# Patient Record
Sex: FEMALE | Race: WHITE | NOT HISPANIC OR LATINO | URBAN - METROPOLITAN AREA
[De-identification: names, ages, dates, MRNs, and addresses within clinical notes are randomized per-mention and may not be internally consistent; named-entity substitution may affect disease eponyms.]

---

## 2017-01-06 ENCOUNTER — OUTPATIENT (OUTPATIENT)
Dept: OUTPATIENT SERVICES | Facility: HOSPITAL | Age: 82
LOS: 1 days | Discharge: HOME | End: 2017-01-06
Payer: MEDICARE

## 2017-01-06 DIAGNOSIS — S83.241A OTHER TEAR OF MEDIAL MENISCUS, CURRENT INJURY, RIGHT KNEE, INITIAL ENCOUNTER: ICD-10-CM

## 2017-01-06 PROCEDURE — 93010 ELECTROCARDIOGRAM REPORT: CPT

## 2017-01-10 ENCOUNTER — OUTPATIENT (OUTPATIENT)
Dept: OUTPATIENT SERVICES | Facility: HOSPITAL | Age: 82
LOS: 1 days | Discharge: HOME | End: 2017-01-10
Payer: MEDICARE

## 2017-01-10 DIAGNOSIS — I10 ESSENTIAL (PRIMARY) HYPERTENSION: ICD-10-CM

## 2017-01-10 DIAGNOSIS — K21.9 GASTRO-ESOPHAGEAL REFLUX DISEASE WITHOUT ESOPHAGITIS: ICD-10-CM

## 2017-01-10 DIAGNOSIS — R06.09 OTHER FORMS OF DYSPNEA: ICD-10-CM

## 2017-01-10 DIAGNOSIS — M25.569 PAIN IN UNSPECIFIED KNEE: ICD-10-CM

## 2017-01-10 DIAGNOSIS — Z86.79 PERSONAL HISTORY OF OTHER DISEASES OF THE CIRCULATORY SYSTEM: ICD-10-CM

## 2017-01-10 DIAGNOSIS — H35.30 UNSPECIFIED MACULAR DEGENERATION: ICD-10-CM

## 2017-01-10 DIAGNOSIS — M19.90 UNSPECIFIED OSTEOARTHRITIS, UNSPECIFIED SITE: ICD-10-CM

## 2017-01-10 DIAGNOSIS — Z87.01 PERSONAL HISTORY OF PNEUMONIA (RECURRENT): ICD-10-CM

## 2017-01-10 DIAGNOSIS — Z95.0 PRESENCE OF CARDIAC PACEMAKER: ICD-10-CM

## 2017-01-10 DIAGNOSIS — M24.10 OTHER ARTICULAR CARTILAGE DISORDERS, UNSPECIFIED SITE: ICD-10-CM

## 2017-01-10 DIAGNOSIS — E78.5 HYPERLIPIDEMIA, UNSPECIFIED: ICD-10-CM

## 2017-01-10 PROCEDURE — 88304 TISSUE EXAM BY PATHOLOGIST: CPT | Mod: 26

## 2017-01-13 DIAGNOSIS — S83.231A COMPLEX TEAR OF MEDIAL MENISCUS, CURRENT INJURY, RIGHT KNEE, INITIAL ENCOUNTER: ICD-10-CM
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
## 2017-01-13 DIAGNOSIS — S83.271A COMPLEX TEAR OF LATERAL MENISCUS, CURRENT INJURY, RIGHT KNEE, INITIAL ENCOUNTER: ICD-10-CM

## 2017-01-13 DIAGNOSIS — Z79.01 LONG TERM (CURRENT) USE OF ANTICOAGULANTS: ICD-10-CM

## 2017-01-13 DIAGNOSIS — M19.90 UNSPECIFIED OSTEOARTHRITIS, UNSPECIFIED SITE: ICD-10-CM

## 2017-01-13 DIAGNOSIS — Z88.8 ALLERGY STATUS TO OTHER DRUGS, MEDICAMENTS AND BIOLOGICAL SUBSTANCES STATUS: ICD-10-CM

## 2017-01-13 DIAGNOSIS — I48.91 UNSPECIFIED ATRIAL FIBRILLATION: ICD-10-CM

## 2017-01-13 DIAGNOSIS — I10 ESSENTIAL (PRIMARY) HYPERTENSION: ICD-10-CM

## 2017-01-13 DIAGNOSIS — I49.5 SICK SINUS SYNDROME: ICD-10-CM

## 2017-01-13 DIAGNOSIS — I25.10 ATHEROSCLEROTIC HEART DISEASE OF NATIVE CORONARY ARTERY WITHOUT ANGINA PECTORIS: ICD-10-CM

## 2017-01-13 DIAGNOSIS — M94.261 CHONDROMALACIA, RIGHT KNEE: ICD-10-CM
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
## 2017-01-13 DIAGNOSIS — M67.51 PLICA SYNDROME, RIGHT KNEE: ICD-10-CM

## 2017-01-13 DIAGNOSIS — Z95.0 PRESENCE OF CARDIAC PACEMAKER: ICD-10-CM

## 2017-04-18 ENCOUNTER — APPOINTMENT (OUTPATIENT)
Dept: ELECTROPHYSIOLOGY | Facility: CLINIC | Age: 82
End: 2017-04-18

## 2017-07-19 ENCOUNTER — APPOINTMENT (OUTPATIENT)
Dept: ELECTROPHYSIOLOGY | Facility: CLINIC | Age: 82
End: 2017-07-19

## 2017-07-19 VITALS
HEIGHT: 60 IN | HEART RATE: 83 BPM | SYSTOLIC BLOOD PRESSURE: 125 MMHG | DIASTOLIC BLOOD PRESSURE: 57 MMHG | WEIGHT: 141 LBS | BODY MASS INDEX: 27.68 KG/M2

## 2017-10-24 ENCOUNTER — APPOINTMENT (OUTPATIENT)
Dept: ELECTROPHYSIOLOGY | Facility: CLINIC | Age: 82
End: 2017-10-24
Payer: MEDICARE

## 2017-10-24 PROCEDURE — 93294 REM INTERROG EVL PM/LDLS PM: CPT

## 2017-10-24 PROCEDURE — 93296 REM INTERROG EVL PM/IDS: CPT

## 2018-01-29 ENCOUNTER — APPOINTMENT (OUTPATIENT)
Dept: ELECTROPHYSIOLOGY | Facility: CLINIC | Age: 83
End: 2018-01-29

## 2018-05-03 ENCOUNTER — APPOINTMENT (OUTPATIENT)
Dept: ELECTROPHYSIOLOGY | Facility: CLINIC | Age: 83
End: 2018-05-03
Payer: MEDICARE

## 2018-05-03 PROCEDURE — 93294 REM INTERROG EVL PM/LDLS PM: CPT

## 2018-05-03 PROCEDURE — 93296 REM INTERROG EVL PM/IDS: CPT

## 2018-07-25 ENCOUNTER — APPOINTMENT (OUTPATIENT)
Dept: ELECTROPHYSIOLOGY | Facility: CLINIC | Age: 83
End: 2018-07-25
Payer: MEDICARE

## 2018-07-25 VITALS
SYSTOLIC BLOOD PRESSURE: 128 MMHG | DIASTOLIC BLOOD PRESSURE: 101 MMHG | BODY MASS INDEX: 27.68 KG/M2 | HEIGHT: 60 IN | HEART RATE: 72 BPM | WEIGHT: 141 LBS

## 2018-07-25 PROCEDURE — 93280 PM DEVICE PROGR EVAL DUAL: CPT

## 2018-10-29 ENCOUNTER — APPOINTMENT (OUTPATIENT)
Dept: ELECTROPHYSIOLOGY | Facility: CLINIC | Age: 83
End: 2018-10-29
Payer: MEDICARE

## 2018-10-29 PROCEDURE — 93294 REM INTERROG EVL PM/LDLS PM: CPT

## 2018-10-29 PROCEDURE — 93296 REM INTERROG EVL PM/IDS: CPT

## 2019-01-28 ENCOUNTER — APPOINTMENT (OUTPATIENT)
Dept: ELECTROPHYSIOLOGY | Facility: CLINIC | Age: 84
End: 2019-01-28

## 2019-05-02 ENCOUNTER — APPOINTMENT (OUTPATIENT)
Dept: ELECTROPHYSIOLOGY | Facility: CLINIC | Age: 84
End: 2019-05-02
Payer: MEDICARE

## 2019-05-02 PROCEDURE — 93296 REM INTERROG EVL PM/IDS: CPT

## 2019-05-02 PROCEDURE — 93294 REM INTERROG EVL PM/LDLS PM: CPT

## 2019-05-07 ENCOUNTER — RX RENEWAL (OUTPATIENT)
Age: 84
End: 2019-05-07

## 2019-05-20 ENCOUNTER — APPOINTMENT (OUTPATIENT)
Dept: GASTROENTEROLOGY | Facility: CLINIC | Age: 84
End: 2019-05-20
Payer: MEDICARE

## 2019-05-20 VITALS
HEIGHT: 60 IN | WEIGHT: 140 LBS | BODY MASS INDEX: 27.48 KG/M2 | SYSTOLIC BLOOD PRESSURE: 121 MMHG | DIASTOLIC BLOOD PRESSURE: 74 MMHG | TEMPERATURE: 98.3 F | HEART RATE: 86 BPM

## 2019-05-20 PROCEDURE — 99212 OFFICE O/P EST SF 10 MIN: CPT

## 2019-05-20 RX ORDER — VERAPAMIL HYDROCHLORIDE 80 MG/1
TABLET ORAL
Refills: 0 | Status: ACTIVE | COMMUNITY

## 2019-05-20 RX ORDER — SOTALOL HYDROCHLORIDE 80 MG/1
80 TABLET ORAL
Refills: 0 | Status: ACTIVE | COMMUNITY

## 2019-05-20 NOTE — HISTORY OF PRESENT ILLNESS
[de-identified] : 84 yo female with GERD symptoms. Patient continues to note heartburn symptoms there is no dysphagia. She's taking pantoprazole once daily. Upper GI series did show that the prior repair of the hiatal hernia is no longer present.

## 2019-05-20 NOTE — ASSESSMENT
[FreeTextEntry1] : 84 yo female with refractory GERD symptoms. Will increase pantoprazole to b.i.d. and add q.h.s. Pepcid AC followup a month. Patient also advised lifestyle modifications.

## 2019-07-26 ENCOUNTER — APPOINTMENT (OUTPATIENT)
Dept: ELECTROPHYSIOLOGY | Facility: CLINIC | Age: 84
End: 2019-07-26
Payer: MEDICARE

## 2019-07-26 VITALS
BODY MASS INDEX: 27.48 KG/M2 | HEART RATE: 86 BPM | SYSTOLIC BLOOD PRESSURE: 141 MMHG | HEIGHT: 60 IN | WEIGHT: 140 LBS | DIASTOLIC BLOOD PRESSURE: 81 MMHG

## 2019-07-26 DIAGNOSIS — I48.0 PAROXYSMAL ATRIAL FIBRILLATION: ICD-10-CM

## 2019-07-26 PROCEDURE — 93280 PM DEVICE PROGR EVAL DUAL: CPT

## 2019-10-28 ENCOUNTER — APPOINTMENT (OUTPATIENT)
Dept: ELECTROPHYSIOLOGY | Facility: CLINIC | Age: 84
End: 2019-10-28
Payer: MEDICARE

## 2019-10-28 PROCEDURE — 93294 REM INTERROG EVL PM/LDLS PM: CPT

## 2019-10-28 PROCEDURE — 93296 REM INTERROG EVL PM/IDS: CPT

## 2019-12-26 ENCOUNTER — RX RENEWAL (OUTPATIENT)
Age: 84
End: 2019-12-26

## 2020-02-03 ENCOUNTER — APPOINTMENT (OUTPATIENT)
Dept: ELECTROPHYSIOLOGY | Facility: CLINIC | Age: 85
End: 2020-02-03
Payer: MEDICARE

## 2020-02-03 DIAGNOSIS — Z95.0 PRESENCE OF CARDIAC PACEMAKER: ICD-10-CM

## 2020-02-03 DIAGNOSIS — I49.5 SICK SINUS SYNDROME: ICD-10-CM

## 2020-02-03 PROCEDURE — 93296 REM INTERROG EVL PM/IDS: CPT

## 2020-02-03 PROCEDURE — 93294 REM INTERROG EVL PM/LDLS PM: CPT

## 2020-02-13 PROBLEM — Z95.0 CARDIAC PACEMAKER IN SITU: Status: ACTIVE | Noted: 2017-05-02

## 2020-02-13 PROBLEM — I49.5 SICK SINUS SYNDROME: Status: ACTIVE | Noted: 2017-05-02

## 2020-05-04 ENCOUNTER — APPOINTMENT (OUTPATIENT)
Dept: ELECTROPHYSIOLOGY | Facility: CLINIC | Age: 85
End: 2020-05-04
Payer: MEDICARE

## 2020-05-04 PROCEDURE — 93296 REM INTERROG EVL PM/IDS: CPT

## 2020-05-04 PROCEDURE — 93294 REM INTERROG EVL PM/LDLS PM: CPT

## 2020-07-07 ENCOUNTER — EMERGENCY (EMERGENCY)
Facility: HOSPITAL | Age: 85
LOS: 0 days | Discharge: HOME | End: 2020-07-07
Attending: EMERGENCY MEDICINE
Payer: MEDICARE

## 2020-07-07 VITALS
OXYGEN SATURATION: 100 % | HEART RATE: 65 BPM | TEMPERATURE: 98 F | RESPIRATION RATE: 16 BRPM | DIASTOLIC BLOOD PRESSURE: 55 MMHG | SYSTOLIC BLOOD PRESSURE: 127 MMHG

## 2020-07-07 VITALS — WEIGHT: 143.08 LBS | HEIGHT: 60 IN

## 2020-07-07 DIAGNOSIS — E78.5 HYPERLIPIDEMIA, UNSPECIFIED: ICD-10-CM

## 2020-07-07 DIAGNOSIS — R06.00 DYSPNEA, UNSPECIFIED: ICD-10-CM

## 2020-07-07 DIAGNOSIS — I48.91 UNSPECIFIED ATRIAL FIBRILLATION: ICD-10-CM

## 2020-07-07 DIAGNOSIS — Z79.01 LONG TERM (CURRENT) USE OF ANTICOAGULANTS: ICD-10-CM

## 2020-07-07 DIAGNOSIS — Z95.0 PRESENCE OF CARDIAC PACEMAKER: ICD-10-CM

## 2020-07-07 DIAGNOSIS — R42 DIZZINESS AND GIDDINESS: ICD-10-CM

## 2020-07-07 DIAGNOSIS — K21.9 GASTRO-ESOPHAGEAL REFLUX DISEASE WITHOUT ESOPHAGITIS: ICD-10-CM

## 2020-07-07 DIAGNOSIS — R06.02 SHORTNESS OF BREATH: ICD-10-CM

## 2020-07-07 DIAGNOSIS — I10 ESSENTIAL (PRIMARY) HYPERTENSION: ICD-10-CM

## 2020-07-07 LAB
ALBUMIN SERPL ELPH-MCNC: 3.4 G/DL — SIGNIFICANT CHANGE UP (ref 3.3–5)
ALP SERPL-CCNC: 100 U/L — SIGNIFICANT CHANGE UP (ref 40–120)
ALT FLD-CCNC: 136 U/L — HIGH (ref 12–78)
ANION GAP SERPL CALC-SCNC: 6 MMOL/L — SIGNIFICANT CHANGE UP (ref 5–17)
AST SERPL-CCNC: 142 U/L — HIGH (ref 15–37)
BILIRUB SERPL-MCNC: 0.4 MG/DL — SIGNIFICANT CHANGE UP (ref 0.2–1.2)
BUN SERPL-MCNC: 17 MG/DL — SIGNIFICANT CHANGE UP (ref 7–23)
CALCIUM SERPL-MCNC: 8.8 MG/DL — SIGNIFICANT CHANGE UP (ref 8.5–10.1)
CHLORIDE SERPL-SCNC: 102 MMOL/L — SIGNIFICANT CHANGE UP (ref 96–108)
CO2 SERPL-SCNC: 25 MMOL/L — SIGNIFICANT CHANGE UP (ref 22–31)
CREAT SERPL-MCNC: 0.98 MG/DL — SIGNIFICANT CHANGE UP (ref 0.5–1.3)
GLUCOSE SERPL-MCNC: 149 MG/DL — HIGH (ref 70–99)
HCT VFR BLD CALC: 39.5 % — SIGNIFICANT CHANGE UP (ref 34.5–45)
HGB BLD-MCNC: 12.7 G/DL — SIGNIFICANT CHANGE UP (ref 11.5–15.5)
MAGNESIUM SERPL-MCNC: 2.2 MG/DL — SIGNIFICANT CHANGE UP (ref 1.6–2.6)
MCHC RBC-ENTMCNC: 28.9 PG — SIGNIFICANT CHANGE UP (ref 27–34)
MCHC RBC-ENTMCNC: 32.2 GM/DL — SIGNIFICANT CHANGE UP (ref 32–36)
MCV RBC AUTO: 89.8 FL — SIGNIFICANT CHANGE UP (ref 80–100)
NT-PROBNP SERPL-SCNC: 3258 PG/ML — HIGH (ref 0–450)
PLATELET # BLD AUTO: 206 K/UL — SIGNIFICANT CHANGE UP (ref 150–400)
POTASSIUM SERPL-MCNC: 5.2 MMOL/L — SIGNIFICANT CHANGE UP (ref 3.5–5.3)
POTASSIUM SERPL-SCNC: 5.2 MMOL/L — SIGNIFICANT CHANGE UP (ref 3.5–5.3)
PROT SERPL-MCNC: 7.2 GM/DL — SIGNIFICANT CHANGE UP (ref 6–8.3)
RBC # BLD: 4.4 M/UL — SIGNIFICANT CHANGE UP (ref 3.8–5.2)
RBC # FLD: 14 % — SIGNIFICANT CHANGE UP (ref 10.3–14.5)
SODIUM SERPL-SCNC: 133 MMOL/L — LOW (ref 135–145)
TROPONIN I SERPL-MCNC: <0.015 NG/ML — SIGNIFICANT CHANGE UP (ref 0.01–0.04)
WBC # BLD: 8.17 K/UL — SIGNIFICANT CHANGE UP (ref 3.8–10.5)
WBC # FLD AUTO: 8.17 K/UL — SIGNIFICANT CHANGE UP (ref 3.8–10.5)

## 2020-07-07 PROCEDURE — 83880 ASSAY OF NATRIURETIC PEPTIDE: CPT

## 2020-07-07 PROCEDURE — 99284 EMERGENCY DEPT VISIT MOD MDM: CPT | Mod: 25

## 2020-07-07 PROCEDURE — 71045 X-RAY EXAM CHEST 1 VIEW: CPT | Mod: 26

## 2020-07-07 PROCEDURE — 84484 ASSAY OF TROPONIN QUANT: CPT

## 2020-07-07 PROCEDURE — 85027 COMPLETE CBC AUTOMATED: CPT

## 2020-07-07 PROCEDURE — 71045 X-RAY EXAM CHEST 1 VIEW: CPT

## 2020-07-07 PROCEDURE — 93010 ELECTROCARDIOGRAM REPORT: CPT

## 2020-07-07 PROCEDURE — 83735 ASSAY OF MAGNESIUM: CPT

## 2020-07-07 PROCEDURE — 36415 COLL VENOUS BLD VENIPUNCTURE: CPT

## 2020-07-07 PROCEDURE — 80053 COMPREHEN METABOLIC PANEL: CPT

## 2020-07-07 PROCEDURE — 99285 EMERGENCY DEPT VISIT HI MDM: CPT

## 2020-07-07 PROCEDURE — 76705 ECHO EXAM OF ABDOMEN: CPT

## 2020-07-07 PROCEDURE — 76705 ECHO EXAM OF ABDOMEN: CPT | Mod: 26

## 2020-07-07 PROCEDURE — 93005 ELECTROCARDIOGRAM TRACING: CPT

## 2020-07-07 NOTE — ED PROVIDER NOTE - PATIENT PORTAL LINK FT
You can access the FollowMyHealth Patient Portal offered by [facility] by registering at the following website: [email or web address]. By joining [employer]’s FollowMyHealth portal, you will also be able to view your health information using other applications (apps) compatible with our system.

## 2020-07-07 NOTE — ED PROVIDER NOTE - CARE PROVIDER_API CALL
Ruslan Soria  GASTROENTEROLOGY  [address] [place] [address]  Phone: [phone]  Fax: [phone]  Follow Up Time:

## 2020-07-07 NOTE — ED PROVIDER NOTE - OBJECTIVE STATEMENT
87 y/o female with PMHx of GERD, HLD, HTN, pacemaker  presents to the ED c/o SOB and dizziness over the last few days. States that she is being worked up for vertigo x1 month. Pt spoke with PCP today and was told to come to the ED for further evaluation. Denies CP, cough, fever or chills. On Pradaxa. No known sick contacts. Allergies: Zoloft. No other complaints at this time. Cardio: Dr. Tripp. 87 y/o female with PMHx of Afib, GERD, HLD, HTN, pacemaker  presents to the ED c/o SOB and dizziness over the last few days. States that she is being worked up for vertigo x1 month. Pt spoke with PCP today and was told to come to the ED for further evaluation. Denies CP, cough, fever or chills. On Pradaxa. No known sick contacts. Allergies: Zoloft. No other complaints at this time. Cardio: Dr. Tripp.

## 2020-07-07 NOTE — ED PROVIDER NOTE - PMH
GERD (gastroesophageal reflux disease)    High cholesterol    HTN (hypertension)    Pacemaker Afib    GERD (gastroesophageal reflux disease)    High cholesterol    HTN (hypertension)    Pacemaker

## 2020-07-07 NOTE — ED ADULT NURSE NOTE - NSIMPLEMENTINTERV_GEN_ALL_ED
Implemented All Fall Risk Interventions:  [place] to call system. Call bell, personal items and telephone within reach. Instruct patient to call for assistance. Room bathroom lighting operational. Non-slip footwear when patient is off stretcher. Physically safe environment: no spills, clutter or unnecessary equipment. Stretcher in lowest position, wheels locked, appropriate side rails in place. Provide visual cue, wrist band, yellow gown, etc. Monitor gait and stability. Monitor for mental status changes and reorient to person, place, and time. Review medications for side effects contributing to fall risk. Reinforce activity limits and safety measures with patient and family.

## 2020-07-07 NOTE — ED STATDOCS - PROGRESS NOTE DETAILS
Michael HAMMOND for ED attending, Dr. Franco: 87 y/o F with PMHx of Afib, GERD, High cholesterol, HTN, Pacemaker  presenting to the ED c/o SOB x2 days. +dizziness Patient reports that over the past few days she has had SOB with associated diaphoresis, feeling "unsteady on her feet. States that she has been having workup for vertigo x1 month, spoke with PCD today and told patient to go to the ED for further evaluation. Denies any CP, cough, fever or chills. Patient will be sent to main for further evaluation.

## 2020-07-07 NOTE — ED ADULT NURSE NOTE - OBJECTIVE STATEMENT
Pt presents to ED for Dizziness. Pt states symptoms a few days ago. Pt also states shes been sweating perfusely. Denies fevers. Pt denies any recent falls. Denies n/v/d. Neuro WNL

## 2020-07-07 NOTE — ED PROVIDER NOTE - NS_ ATTENDINGSCRIBEDETAILS _ED_A_ED_FT
I, Teofilo Matt MD,  performed the initial face to face bedside interview with this patient regarding history of present illness, review of symptoms and relevant past medical, social and family history.  I completed an independent physical examination.    The history, relevant review of systems, past medical and surgical history, medical decision making, and physical examination was documented by the scribe in my presence and I attest to the accuracy of the documentation.

## 2020-07-07 NOTE — ED STATDOCS - PMH
Afib    GERD (gastroesophageal reflux disease)    High cholesterol    HTN (hypertension)    Pacemaker

## 2020-07-07 NOTE — ED PROVIDER NOTE - NSFOLLOWUPINSTRUCTIONS_ED_ALL_ED_FT
Shortness of Breath    WHAT YOU NEED TO KNOW:    Shortness of breath is a feeling that you cannot get enough air when you breathe in. You may have this feeling only during activity, or all the time. Your symptoms can range from mild to severe. Shortness of breath may be a sign of a serious health condition that needs immediate care.    DISCHARGE INSTRUCTIONS:    Return to the emergency department if:     Your signs and symptoms are the same or worse within 24 hours of treatment.       The skin over your ribs or on your neck sinks in when you breathe.       You feel confused or dizzy.    Contact your healthcare provider if:     You have new or worsening symptoms.      You have questions or concerns about your condition or care.    Medicines:     Medicines may be used to treat the cause of your symptoms. You may need medicine to treat a bacterial infection or reduce anxiety. Other medicines may be used to open your airway, reduce swelling, or remove extra fluid. If you have a heart condition, you may need medicine to help your heart beat more strongly or regularly.      Take your medicine as directed. Contact your healthcare provider if you think your medicine is not helping or if you have side effects. Tell him or her if you are allergic to any medicine. Keep a list of the medicines, vitamins, and herbs you take. Include the amounts, and when and why you take them. Bring the list or the pill bottles to follow-up visits. Carry your medicine list with you in case of an emergency.    Manage shortness of breath:     Create an action plan. You and your healthcare provider can work together to create a plan for how to handle shortness of breath. The plan can include daily activities, treatment changes, and what to do if you have severe breathing problems.      Lean forward on your elbows when you sit. This helps your lungs expand and may make it easier to breathe.      Use pursed-lip breathing any time you feel short of breath. Breathe in through your nose and then slowly breathe out through your mouth with your lips slightly puckered. It should take you twice as long to breathe out as it did to breathe in.Breathe in Breathe out           Do not smoke. Nicotine and other chemicals in cigarettes and cigars can cause lung damage and make shortness of breath worse. Ask your healthcare provider for information if you currently smoke and need help to quit. E-cigarettes or smokeless tobacco still contain nicotine. Talk to your healthcare provider before you use these products.      Reach or maintain a healthy weight. Your healthcare provider can help you create a safe weight loss plan if you are overweight.      Exercise as directed. Exercise can help your lungs work more easily. Exercise can also help you lose weight if needed. Try to get at least 30 minutes of exercise most days of the week.    Follow up with your healthcare provider or specialist as directed: Write down your questions so you remember to ask them during your visits.    Please follow up with GI for elevated LFT's. Positive for cholelithiasis.

## 2020-07-10 ENCOUNTER — INPATIENT (INPATIENT)
Facility: HOSPITAL | Age: 85
LOS: 1 days | Discharge: HOME | DRG: 291 | End: 2020-07-12
Attending: HOSPITALIST | Admitting: INTERNAL MEDICINE
Payer: MEDICARE

## 2020-07-10 VITALS — WEIGHT: 149.91 LBS | HEIGHT: 60 IN

## 2020-07-10 DIAGNOSIS — R06.02 SHORTNESS OF BREATH: ICD-10-CM

## 2020-07-10 LAB
ALBUMIN SERPL ELPH-MCNC: 3.5 G/DL — SIGNIFICANT CHANGE UP (ref 3.3–5)
ALP SERPL-CCNC: 93 U/L — SIGNIFICANT CHANGE UP (ref 40–120)
ALT FLD-CCNC: 94 U/L — HIGH (ref 12–78)
ANION GAP SERPL CALC-SCNC: 7 MMOL/L — SIGNIFICANT CHANGE UP (ref 5–17)
APTT BLD: 44.6 SEC — HIGH (ref 27.5–35.5)
AST SERPL-CCNC: 54 U/L — HIGH (ref 15–37)
BASOPHILS # BLD AUTO: 0.05 K/UL — SIGNIFICANT CHANGE UP (ref 0–0.2)
BASOPHILS NFR BLD AUTO: 0.5 % — SIGNIFICANT CHANGE UP (ref 0–2)
BILIRUB SERPL-MCNC: 0.4 MG/DL — SIGNIFICANT CHANGE UP (ref 0.2–1.2)
BUN SERPL-MCNC: 14 MG/DL — SIGNIFICANT CHANGE UP (ref 7–23)
CALCIUM SERPL-MCNC: 8.9 MG/DL — SIGNIFICANT CHANGE UP (ref 8.5–10.1)
CHLORIDE SERPL-SCNC: 91 MMOL/L — LOW (ref 96–108)
CO2 SERPL-SCNC: 26 MMOL/L — SIGNIFICANT CHANGE UP (ref 22–31)
CREAT SERPL-MCNC: 0.9 MG/DL — SIGNIFICANT CHANGE UP (ref 0.5–1.3)
EOSINOPHIL # BLD AUTO: 0.06 K/UL — SIGNIFICANT CHANGE UP (ref 0–0.5)
EOSINOPHIL NFR BLD AUTO: 0.6 % — SIGNIFICANT CHANGE UP (ref 0–6)
GLUCOSE SERPL-MCNC: 126 MG/DL — HIGH (ref 70–99)
HCT VFR BLD CALC: 37.9 % — SIGNIFICANT CHANGE UP (ref 34.5–45)
HGB BLD-MCNC: 13.1 G/DL — SIGNIFICANT CHANGE UP (ref 11.5–15.5)
IMM GRANULOCYTES NFR BLD AUTO: 0.2 % — SIGNIFICANT CHANGE UP (ref 0–1.5)
INR BLD: 1.36 RATIO — HIGH (ref 0.88–1.16)
LYMPHOCYTES # BLD AUTO: 1.6 K/UL — SIGNIFICANT CHANGE UP (ref 1–3.3)
LYMPHOCYTES # BLD AUTO: 16.6 % — SIGNIFICANT CHANGE UP (ref 13–44)
MCHC RBC-ENTMCNC: 29.7 PG — SIGNIFICANT CHANGE UP (ref 27–34)
MCHC RBC-ENTMCNC: 34.6 GM/DL — SIGNIFICANT CHANGE UP (ref 32–36)
MCV RBC AUTO: 85.9 FL — SIGNIFICANT CHANGE UP (ref 80–100)
MONOCYTES # BLD AUTO: 1.2 K/UL — HIGH (ref 0–0.9)
MONOCYTES NFR BLD AUTO: 12.4 % — SIGNIFICANT CHANGE UP (ref 2–14)
NEUTROPHILS # BLD AUTO: 6.72 K/UL — SIGNIFICANT CHANGE UP (ref 1.8–7.4)
NEUTROPHILS NFR BLD AUTO: 69.7 % — SIGNIFICANT CHANGE UP (ref 43–77)
NT-PROBNP SERPL-SCNC: 6877 PG/ML — HIGH (ref 0–450)
PLATELET # BLD AUTO: 254 K/UL — SIGNIFICANT CHANGE UP (ref 150–400)
POTASSIUM SERPL-MCNC: 4.7 MMOL/L — SIGNIFICANT CHANGE UP (ref 3.5–5.3)
POTASSIUM SERPL-SCNC: 4.7 MMOL/L — SIGNIFICANT CHANGE UP (ref 3.5–5.3)
PROT SERPL-MCNC: 7.5 GM/DL — SIGNIFICANT CHANGE UP (ref 6–8.3)
PROTHROM AB SERPL-ACNC: 15.7 SEC — HIGH (ref 10.6–13.6)
RBC # BLD: 4.41 M/UL — SIGNIFICANT CHANGE UP (ref 3.8–5.2)
RBC # FLD: 13.7 % — SIGNIFICANT CHANGE UP (ref 10.3–14.5)
SODIUM SERPL-SCNC: 124 MMOL/L — LOW (ref 135–145)
TROPONIN I SERPL-MCNC: <0.015 NG/ML — SIGNIFICANT CHANGE UP (ref 0.01–0.04)
WBC # BLD: 9.65 K/UL — SIGNIFICANT CHANGE UP (ref 3.8–10.5)
WBC # FLD AUTO: 9.65 K/UL — SIGNIFICANT CHANGE UP (ref 3.8–10.5)

## 2020-07-10 PROCEDURE — 71045 X-RAY EXAM CHEST 1 VIEW: CPT

## 2020-07-10 PROCEDURE — 80053 COMPREHEN METABOLIC PANEL: CPT

## 2020-07-10 PROCEDURE — 93306 TTE W/DOPPLER COMPLETE: CPT

## 2020-07-10 PROCEDURE — 71045 X-RAY EXAM CHEST 1 VIEW: CPT | Mod: 26

## 2020-07-10 PROCEDURE — 99223 1ST HOSP IP/OBS HIGH 75: CPT | Mod: AI

## 2020-07-10 PROCEDURE — 83930 ASSAY OF BLOOD OSMOLALITY: CPT

## 2020-07-10 PROCEDURE — 83880 ASSAY OF NATRIURETIC PEPTIDE: CPT

## 2020-07-10 PROCEDURE — 93010 ELECTROCARDIOGRAM REPORT: CPT

## 2020-07-10 PROCEDURE — 84484 ASSAY OF TROPONIN QUANT: CPT

## 2020-07-10 PROCEDURE — 84443 ASSAY THYROID STIM HORMONE: CPT

## 2020-07-10 PROCEDURE — 36415 COLL VENOUS BLD VENIPUNCTURE: CPT

## 2020-07-10 PROCEDURE — 86769 SARS-COV-2 COVID-19 ANTIBODY: CPT

## 2020-07-10 RX ORDER — ASPIRIN/CALCIUM CARB/MAGNESIUM 324 MG
325 TABLET ORAL ONCE
Refills: 0 | Status: COMPLETED | OUTPATIENT
Start: 2020-07-10 | End: 2020-07-10

## 2020-07-10 RX ADMIN — Medication 325 MILLIGRAM(S): at 20:18

## 2020-07-10 NOTE — ED PROVIDER NOTE - CARE PLAN
Principal Discharge DX:	SOB (shortness of breath)  Goal:	worsening CHF, sent in by Dr. Campa for Echo, admission, THOMPSON  Assessment and plan of treatment:	- Troponin labs, to admit to trend to rule out ACS  - Possible worsening chronic CHF (no known ECHO)

## 2020-07-10 NOTE — ED ADULT NURSE NOTE - CHIEF COMPLAINT QUOTE
pt reports chf exacerbation , fatigue , difficulty ambulating , was seen on 7/7, sent by Dr. graves for admission and further management

## 2020-07-10 NOTE — ED PROVIDER NOTE - CLINICAL SUMMARY MEDICAL DECISION MAKING FREE TEXT BOX
Sent in by Dr. Campa, suspect of new onset CHF, SOB, dyspnea on exertion. Will admit to r/o ACS for echo by Dr. Campa and further evaluation.

## 2020-07-10 NOTE — H&P ADULT - ASSESSMENT
87 y/o F PMHx significant for CAD, Afib on DOAC therapy, CHF (unknown EF), s/p pacemaker placement, Hypertension, Hyperlipidemia, and GERD presents to  for further evaluation and management of progressive shortness of breath, dyspnea on exertion, and weight gain worse over the past 3 days. The patient denied any associated chest pain and had a recent diagnosis of CHF by her Cardiologist.  Labs => Na 124, Cl 91, AST/ALT 54/94, proBNP 6877, TnI (-) x 1. In the ED the patient was given ASA 324mg PO x 1.    #Acute Decompensated CHF  ~admit to Telemetry  ~serial Pauly/EKGs  ~strict I/Os  ~daily weights  ~will start Ethacrynic acid 50mg po daily x 1 for now (please reassess further in the am)  ~2DECHO in the am  ~f/u w/ Cardiology    ~AFib  ~cont. Pradaxa 150mg po bid  ~cont. Verapamil ER 180mg po daily  ~cont. Sotalol 80mg po bid    #Hyperlipidemia  ~cont. statin therapy w/ Simvastatin 20mg po qhs    #Vte ppx  ~cont. DOAC therapy as above 85 y/o F PMHx significant for CAD, Afib on DOAC therapy, CHF (unknown EF), s/p pacemaker placement, Hypertension, Hyperlipidemia, and GERD presents to  for further evaluation and management of progressive shortness of breath, dyspnea on exertion, and weight gain worse over the past 3 days. The patient denied any associated chest pain and had a recent diagnosis of CHF by her Cardiologist.  Labs => Na 124, Cl 91, AST/ALT 54/94, proBNP 6877, TnI (-) x 1. In the ED the patient was given ASA 324mg PO x 1.    #Acute Decompensated CHF  ~admit to Telemetry  ~serial Pauly/EKGs  ~strict I/Os  ~daily weights  ~will start Ethacrynic acid 50mg po daily x 1 for now (please reassess further in the am)  ~2DECHO in the am  ~f/u w/ Cardiology    #Elevated Transaminases  ~likely due to congestive hepatopathy  ~f/u repeat labs in the am  ~f/u ABD U/S     ~AFib  ~cont. Pradaxa 150mg po bid  ~cont. Verapamil ER 180mg po daily  ~cont. Sotalol 80mg po bid    #Hyperlipidemia  ~cont. statin therapy w/ Simvastatin 20mg po qhs    #Vte ppx  ~cont. DOAC therapy as above

## 2020-07-10 NOTE — ED ADULT NURSE NOTE - OBJECTIVE STATEMENT
pt is an 87 y/o female w/ pmhx of HTN, HLD, afib, GERD presenting to ED for eval of progressive dyspnea over the past 4 days. pt states she was seen by PMD and was told she has new onset of CHF. pt denies chest pain, palpitations, HA, dizziness, abd pain, n/v/d, weight gain or pedal edema pt is an 85 y/o female w/ pmhx of PPM, HTN, HLD, afib, GERD presenting to ED for eval of progressive dyspnea over the past 4 days. pt states she was seen by PMD and was told she has new onset of CHF. pt denies chest pain, palpitations, HA, dizziness, abd pain, n/v/d, weight gain or pedal edema

## 2020-07-10 NOTE — ED PROVIDER NOTE - MUSCULOSKELETAL, MLM
Spine appears normal, range of motion is not limited, no muscle or joint tenderness. +mild/mod swelling to b/l LE pitting edema, 2+ pulses in dorsalis pedis artery. 5/5 strength of upper and lower extremities, no nuchal rigidity.

## 2020-07-10 NOTE — H&P ADULT - NSICDXPASTMEDICALHX_GEN_ALL_CORE_FT
PAST MEDICAL HISTORY:  Afib     GERD (gastroesophageal reflux disease)     High cholesterol     HTN (hypertension)     Pacemaker

## 2020-07-10 NOTE — ED PROVIDER NOTE - CARDIAC, MLM
Normal rate, paced regular rhythm on monitor.  Heart sounds S1, S2.  No murmurs, rubs or gallops. Normal rate, paced regular rhythm on monitor.  Heart sounds S1, S2.  No rubs or gallops.

## 2020-07-10 NOTE — H&P ADULT - NSHPPHYSICALEXAM_GEN_ALL_CORE
Vital Signs Last 24 Hrs  T(C): 36.5 (10 Jul 2020 22:46), Max: 36.8 (10 Jul 2020 20:22)  T(F): 97.7 (10 Jul 2020 22:46), Max: 98.2 (10 Jul 2020 20:22)  HR: 65 (10 Jul 2020 22:46) (65 - 65)  BP: 149/70 (10 Jul 2020 22:46) (139/83 - 152/77)  BP(mean): 97 (10 Jul 2020 22:11) (97 - 97)  RR: 18 (10 Jul 2020 22:46) (16 - 18)  SpO2: 99% (10 Jul 2020 22:46) (98% - 100%)

## 2020-07-10 NOTE — ED PROVIDER NOTE - NS_ ATTENDINGSCRIBEDETAILS _ED_A_ED_FT
I José Luis Bennett MD saw and examined the patient. Scribe documented for me and under my supervision. I have modified the scribe's documentation where necessary to reflect my history, physical exam and other relevant documentations pertinent to the care of the patient.

## 2020-07-10 NOTE — H&P ADULT - HISTORY OF PRESENT ILLNESS
85 y/o F PMHx significant for CAD, Afib on DOAC therapy, CHF (unknown EF), s/p pacemaker placement, Hypertension, Hyperlipidemia, and GERD presents to  for further evaluation and management of progressive shortness of breath, dyspnea on exertion, and weight gain worse over the past 3 days. The patient denied any associated chest pain and had a recent diagnosis of CHF by her Cardiologist.  Labs => Na 124, Cl 91, AST/ALT 54/94, proBNP 6877, TnI (-) x 1. In the ED the patient was given ASA 324mg PO x 1.

## 2020-07-10 NOTE — ED PROVIDER NOTE - EYES, MLM
Clear bilaterally, pupils equal, round and reactive to light. Clear bilaterally, pupils equal, round and reactive to light. EOMI. Visual fields intact x 4 fields.

## 2020-07-10 NOTE — ED ADULT NURSE NOTE - CHPI ED NUR SYMPTOMS NEG
no body aches/no cough/no chest pain/no diaphoresis/no headache/no chills/no wheezing/no edema/no hemoptysis/no fever

## 2020-07-10 NOTE — ED PROVIDER NOTE - OBJECTIVE STATEMENT
85 y/o female with PMHx of Afib, GERD, HTN controlled with medication, HLD controlled, CHF, s/p pacemaker placement presents to the ED c/o SOB. Pt reports CHF exacerbation, was sent by Dr. Mock for admission. Came to ED x4 days ago for SOB and lightheadedness. Workup included blood work, CT IV contrast and XR, DC'ed home. Today, pt denies COVID exposure, fever, chills, syncope/near syncope. PCP: Dr. Piedra. Cardiologist: Dr. Campa. Saw cardiologist x3 days ago, who diagnosed as CHF, as per results from [facility] visit. Reports exertional dyspnea ischemia. Symptomatic bradycardia. No past hx of heart disease. Primary contact, daughter, Janelle [phone]. Son, Santy, [phone]. 85 y/o female with PMHx of Afib, GERD, HTN controlled with medication, HLD controlled, CHF, s/p pacemaker placement  secondary to symptomatic bradycardia presents to the ED c/o SOB. Pt reports CHF exacerbation, was sent by Dr. Mock for admission. Came to ED x4 days ago for SOB and lightheadedness. Workup included blood work, CT IV contrast and XR, DC'ed home. Today, pt denies COVID exposure, fever, chills, syncope/near syncope. PCP: Dr. Piedra. Cardiologist: Dr. Campa. Saw cardiologist x3 days ago, who diagnosed as CHF, as per results from [facility] visit. Reports exertional dyspnea, SOB. No past hx of heart disease. Primary contact, daughter, Janelle [phone]. Son, Santy, [phone].

## 2020-07-10 NOTE — ED PROVIDER NOTE - PLAN OF CARE
worsening CHF, sent in by Dr. Campa for Echo, admission, THOMPSON - Troponin labs, to admit to trend to rule out ACS  - Possible worsening chronic CHF (no known ECHO)

## 2020-07-10 NOTE — ED ADULT NURSE NOTE - NSIMPLEMENTINTERV_GEN_ALL_ED
Implemented All Fall with Harm Risk Interventions:  [place] to call system. Call bell, personal items and telephone within reach. Instruct patient to call for assistance. Room bathroom lighting operational. Non-slip footwear when patient is off stretcher. Physically safe environment: no spills, clutter or unnecessary equipment. Stretcher in lowest position, wheels locked, appropriate side rails in place. Provide visual cue, wrist band, yellow gown, etc. Monitor gait and stability. Monitor for mental status changes and reorient to person, place, and time. Review medications for side effects contributing to fall risk. Reinforce activity limits and safety measures with patient and family. Provide visual clues: red socks.

## 2020-07-10 NOTE — ED ADULT TRIAGE NOTE - CHIEF COMPLAINT QUOTE
pt reports chf exacerbation sent by Dr. graves for admission pt reports chf exacerbation , sent by Dr. graves for admission, pt reports chf exacerbation , fatigue , difficulty ambulating , was seen on 7/7, sent by Dr. graves for admission and further management

## 2020-07-10 NOTE — ED ADULT NURSE REASSESSMENT NOTE - NS ED NURSE REASSESS COMMENT FT1
Received pt from marcelle harris rn. pt resting on bed with no complaint at this time. breathing is even and unlabored, patient not requiring oxygen at this time. Pt skin color consistent with ethnicity, no cyanosis or pallor noted. will ctm

## 2020-07-10 NOTE — ED PROVIDER NOTE - CONSTITUTIONAL, MLM
normal... Well appearing, non-toxic awake, alert, oriented to person, place, time/situation and in no apparent distress. Well appearing, non-toxic awake, alert, oriented to person, place, time/situation and in no apparent distress. Nontoxic appearing.

## 2020-07-11 DIAGNOSIS — Z95.0 PRESENCE OF CARDIAC PACEMAKER: Chronic | ICD-10-CM

## 2020-07-11 LAB
ALBUMIN SERPL ELPH-MCNC: 3.3 G/DL — SIGNIFICANT CHANGE UP (ref 3.3–5)
ALP SERPL-CCNC: 87 U/L — SIGNIFICANT CHANGE UP (ref 40–120)
ALT FLD-CCNC: 74 U/L — SIGNIFICANT CHANGE UP (ref 12–78)
ANION GAP SERPL CALC-SCNC: 10 MMOL/L — SIGNIFICANT CHANGE UP (ref 5–17)
AST SERPL-CCNC: 39 U/L — HIGH (ref 15–37)
BILIRUB SERPL-MCNC: 0.4 MG/DL — SIGNIFICANT CHANGE UP (ref 0.2–1.2)
BUN SERPL-MCNC: 12 MG/DL — SIGNIFICANT CHANGE UP (ref 7–23)
CALCIUM SERPL-MCNC: 8.5 MG/DL — SIGNIFICANT CHANGE UP (ref 8.5–10.1)
CHLORIDE SERPL-SCNC: 94 MMOL/L — LOW (ref 96–108)
CO2 SERPL-SCNC: 24 MMOL/L — SIGNIFICANT CHANGE UP (ref 22–31)
CREAT SERPL-MCNC: 0.68 MG/DL — SIGNIFICANT CHANGE UP (ref 0.5–1.3)
GLUCOSE SERPL-MCNC: 109 MG/DL — HIGH (ref 70–99)
NT-PROBNP SERPL-SCNC: 5865 PG/ML — HIGH (ref 0–450)
POTASSIUM SERPL-MCNC: 3.7 MMOL/L — SIGNIFICANT CHANGE UP (ref 3.5–5.3)
POTASSIUM SERPL-SCNC: 3.7 MMOL/L — SIGNIFICANT CHANGE UP (ref 3.5–5.3)
PROT SERPL-MCNC: 7.1 GM/DL — SIGNIFICANT CHANGE UP (ref 6–8.3)
SARS-COV-2 IGG SERPL QL IA: NEGATIVE — SIGNIFICANT CHANGE UP
SARS-COV-2 IGM SERPL IA-ACNC: 0.09 INDEX — SIGNIFICANT CHANGE UP
SARS-COV-2 RNA SPEC QL NAA+PROBE: SIGNIFICANT CHANGE UP
SODIUM SERPL-SCNC: 128 MMOL/L — LOW (ref 135–145)
TROPONIN I SERPL-MCNC: <0.015 NG/ML — SIGNIFICANT CHANGE UP (ref 0.01–0.04)
TROPONIN I SERPL-MCNC: <0.015 NG/ML — SIGNIFICANT CHANGE UP (ref 0.01–0.04)
TSH SERPL-MCNC: 1.24 UU/ML — SIGNIFICANT CHANGE UP (ref 0.34–4.82)

## 2020-07-11 PROCEDURE — 93306 TTE W/DOPPLER COMPLETE: CPT | Mod: 26

## 2020-07-11 PROCEDURE — 99233 SBSQ HOSP IP/OBS HIGH 50: CPT

## 2020-07-11 RX ORDER — BUMETANIDE 0.25 MG/ML
1 INJECTION INTRAMUSCULAR; INTRAVENOUS ONCE
Refills: 0 | Status: COMPLETED | OUTPATIENT
Start: 2020-07-11 | End: 2020-07-11

## 2020-07-11 RX ORDER — VERAPAMIL HCL 240 MG
180 CAPSULE, EXTENDED RELEASE PELLETS 24 HR ORAL DAILY
Refills: 0 | Status: DISCONTINUED | OUTPATIENT
Start: 2020-07-11 | End: 2020-07-12

## 2020-07-11 RX ORDER — SIMVASTATIN 20 MG/1
20 TABLET, FILM COATED ORAL AT BEDTIME
Refills: 0 | Status: DISCONTINUED | OUTPATIENT
Start: 2020-07-11 | End: 2020-07-12

## 2020-07-11 RX ORDER — DABIGATRAN ETEXILATE MESYLATE 150 MG/1
150 CAPSULE ORAL EVERY 12 HOURS
Refills: 0 | Status: DISCONTINUED | OUTPATIENT
Start: 2020-07-11 | End: 2020-07-12

## 2020-07-11 RX ORDER — ETHACRYNIC ACID 25 MG/1
50 TABLET ORAL DAILY
Refills: 0 | Status: DISCONTINUED | OUTPATIENT
Start: 2020-07-11 | End: 2020-07-11

## 2020-07-11 RX ORDER — METOCLOPRAMIDE HCL 10 MG
10 TABLET ORAL DAILY
Refills: 0 | Status: DISCONTINUED | OUTPATIENT
Start: 2020-07-11 | End: 2020-07-12

## 2020-07-11 RX ORDER — ANASTROZOLE 1 MG/1
1 TABLET ORAL DAILY
Refills: 0 | Status: DISCONTINUED | OUTPATIENT
Start: 2020-07-11 | End: 2020-07-12

## 2020-07-11 RX ORDER — SOTALOL HCL 120 MG
80 TABLET ORAL
Refills: 0 | Status: DISCONTINUED | OUTPATIENT
Start: 2020-07-11 | End: 2020-07-12

## 2020-07-11 RX ORDER — PANTOPRAZOLE SODIUM 20 MG/1
40 TABLET, DELAYED RELEASE ORAL
Refills: 0 | Status: DISCONTINUED | OUTPATIENT
Start: 2020-07-11 | End: 2020-07-12

## 2020-07-11 RX ORDER — BUMETANIDE 0.25 MG/ML
1 INJECTION INTRAMUSCULAR; INTRAVENOUS DAILY
Refills: 0 | Status: COMPLETED | OUTPATIENT
Start: 2020-07-11 | End: 2020-07-11

## 2020-07-11 RX ADMIN — Medication 180 MILLIGRAM(S): at 08:55

## 2020-07-11 RX ADMIN — Medication 10 MILLIGRAM(S): at 08:55

## 2020-07-11 RX ADMIN — BUMETANIDE 1 MILLIGRAM(S): 0.25 INJECTION INTRAMUSCULAR; INTRAVENOUS at 11:47

## 2020-07-11 RX ADMIN — PANTOPRAZOLE SODIUM 40 MILLIGRAM(S): 20 TABLET, DELAYED RELEASE ORAL at 21:33

## 2020-07-11 RX ADMIN — Medication 80 MILLIGRAM(S): at 21:33

## 2020-07-11 RX ADMIN — ANASTROZOLE 1 MILLIGRAM(S): 1 TABLET ORAL at 08:55

## 2020-07-11 RX ADMIN — DABIGATRAN ETEXILATE MESYLATE 150 MILLIGRAM(S): 150 CAPSULE ORAL at 08:55

## 2020-07-11 RX ADMIN — PANTOPRAZOLE SODIUM 40 MILLIGRAM(S): 20 TABLET, DELAYED RELEASE ORAL at 08:55

## 2020-07-11 RX ADMIN — BUMETANIDE 1 MILLIGRAM(S): 0.25 INJECTION INTRAMUSCULAR; INTRAVENOUS at 03:56

## 2020-07-11 RX ADMIN — SIMVASTATIN 20 MILLIGRAM(S): 20 TABLET, FILM COATED ORAL at 21:33

## 2020-07-11 RX ADMIN — DABIGATRAN ETEXILATE MESYLATE 150 MILLIGRAM(S): 150 CAPSULE ORAL at 21:33

## 2020-07-11 RX ADMIN — Medication 80 MILLIGRAM(S): at 08:55

## 2020-07-11 NOTE — CONSULT NOTE ADULT - SUBJECTIVE AND OBJECTIVE BOX
Patient is a 86y old  Female who presents with a chief complaint of Shortness of Breath (10 Jul 2020 23:40)      HPI:  7/11/2020- Cardiology Consultation: 86 hear old woman was well until 7/5/2020 when she noted severe dyspnea walking from room to room and episodes of lightheadedness and diaphoresis. She felt she had gained weight but did not weigh herself. She entered the ED [facility] 7/7 and was evaluated and discharged without treatment and told the diagnosis was dehydration. Abnormal labs at that time included Na 133, ,  GFR 52n glucose 149, pro BNP 3258. Troponin was normal and the CXR showed no acute disease. Abdominal US showed fatty infiltration of the liver and gallstones. I saw her as a outpatient on 7/8 without change in her dyspnea on exertion At that time bibasilar rhonchi and slight expiratory wheeze was present. I diagnosed her with new onset heart failure with etiology to be determined and begun torsemide 5 mg qd and scheduled TTE. She did not begin torsemide until 7/9 and felt no better on the AM of 7/10 so I advised she increase torsemide to 10 mg qd. She felt no better in the PM on 7/10 so I advised her to enter the ED. Since admission she has received bumetanide 1 mg IV and today she feels significantly improved and denies shortness of breath or lightheadedness.  At no point has she had chest pain, abdominal pain, pedal edema. fever or cough. There is no prior history of CAD or heart failure. She does have a history of HBP, HLD, paroxysmal atrial flutter, sick sinus syndrome post DDD PPM 10/2009. Acute pleuropericarditis did occur post pacemaker insertion 11/2009 and was managed medically. She was diagnosed with left breast cancer 1/2019 and has received treatment with anastrozole therapy alone. No radiation therapy was given. She is followed at a breast clinic at [facility]. She has been on chronic oral anticoagulation with dabigatran.  Meds: Pradaxa 150 mg qd, simvastatin 20 mg qd, sotalol 80 mg bid, verapamil ER 180mg qd, torsemide 5 mg qd, pantoprazole 40 mg bid, anastrozole 1 mg qd.  Allergies- Zoloft.  SH- lives alone, former smoker, no drug or alcohol abuse.     85 y/o F PMHx significant for CAD, Afib on DOAC therapy, CHF (unknown EF), s/p pacemaker placement, Hypertension, Hyperlipidemia, and GERD presents to  for further evaluation and management of progressive shortness of breath, dyspnea on exertion, and weight gain worse over the past 3 days. The patient denied any associated chest pain and had a recent diagnosis of CHF by her Cardiologist.  Labs => Na 124, Cl 91, AST/ALT 54/94, proBNP 6877, TnI (-) x 1. In the ED the patient was given ASA 324mg PO x 1. (10 Jul 2020 23:40)      PAST MEDICAL & SURGICAL HISTORY:  Afib  GERD (gastroesophageal reflux disease)  HTN (hypertension)  High cholesterol  Pacemaker  Cardiac pacemaker        MEDICATIONS  (STANDING):  anastrozole 1 milliGRAM(s) Oral daily  buMETAnide Injectable 1 milliGRAM(s) IV Push daily  dabigatran 150 milliGRAM(s) Oral every 12 hours  metoclopramide 10 milliGRAM(s) Oral daily  pantoprazole    Tablet 40 milliGRAM(s) Oral two times a day  simvastatin 20 milliGRAM(s) Oral at bedtime  sotalol 80 milliGRAM(s) Oral two times a day  verapamil  milliGRAM(s) Oral daily    MEDICATIONS  (PRN):      FAMILY HISTORY:  No pertinent family history in first degree relatives      SOCIAL HISTORY:  Tobacco-           Alcohol-              Illicit drugs-              Occupation-              Marital  status-  REVIEW OF SYSTEM:  Pertinent items are noted in HPI.    Vital Signs Last 24 Hrs  T(C): 36.5 (10 Jul 2020 22:46), Max: 36.8 (10 Jul 2020 20:22)  T(F): 97.7 (10 Jul 2020 22:46), Max: 98.2 (10 Jul 2020 20:22)  HR: 66 (11 Jul 2020 04:00) (65 - 66)  BP: 139/65 (11 Jul 2020 04:00) (139/65 - 152/77)  BP(mean): 97 (10 Jul 2020 22:11) (97 - 97)  RR: 18 (10 Jul 2020 22:46) (16 - 18)  SpO2: 99% (10 Jul 2020 22:46) (98% - 100%)    I&O's Summary    10 Jul 2020 07:01  -  11 Jul 2020 07:00  --------------------------------------------------------  IN: 0 mL / OUT: 1300 mL / NET: -1300 mL      PHYSICAL EXAM  General Appearance:  comfortable  HEENT: PERRL, conjunctiva clear, EOM's intact, non injected pharynx, no exudate, TM   normal  Neck: Supple, , no adenopathy, thyroid: not enlarged, no carotid bruit or JVD  Back: Symmetric, no  tenderness,no soft tissue tenderness  Lungs: Clear to auscultation bilaterally,no adventitious breath sounds, normal   expiratory phase  Heart: Regular rate and rhythm, S1, S2 normal, 2/6 systolic murmur LLSB, healed PPM site.  Abdomen: Soft, non-tender, bowel sounds active , no hepatosplenomegaly  Extremities: no cyanosis or edema, no joint swelling  Skin: Skin color, texture normal, no rashes   Neurologic: Alert and oriented X3 , cranial nerves intact, sensory and motor normal,        INTERPRETATION OF TELEMETRY: V paced    ECG:V pacing 63 BPM, underlying atrial fibrillation        LABS:                          13.1   9.65  )-----------( 254      ( 10 Jul 2020 17:52 )             37.9     07-10    124<L>  |  91<L>  |  14  ----------------------------<  126<H>  4.7   |  26  |  0.90    Ca    8.9      10 Jul 2020 17:52    TPro  7.5  /  Alb  3.5  /  TBili  0.4  /  DBili  x   /  AST  54<H>  /  ALT  94<H>  /  AlkPhos  93  07-10    CARDIAC MARKERS ( 11 Jul 2020 06:39 )  <0.015 ng/mL / x     / x     / x     / x      CARDIAC MARKERS ( 11 Jul 2020 03:05 )  <0.015 ng/mL / x     / x     / x     / x      CARDIAC MARKERS ( 10 Jul 2020 17:52 )  <0.015 ng/mL / x     / x     / x     / x            Pro BNP  6877 07-10 @ 17:52  D Dimer  -- 07-10 @ 17:52  Pro BNP  3258 07-07 @ 15:06  D Dimer  -- 07-07 @ 15:06    PT/INR - ( 10 Jul 2020 17:52 )   PT: 15.7 sec;   INR: 1.36 ratio         PTT - ( 10 Jul 2020 17:52 )  PTT:44.6 sec          RADIOLOGY & ADDITIONAL STUDIES:      EXAM:  XR CHEST 1 VIEW                          PROCEDURE DATE:  07/10/2020      INTERPRETATION:  XR CHEST    Single AP view    HISTORY:  Congestive heart failure    Comparison: Chest x-ray 7/7/2020      Left dual-lead pacer.    The cardiac silhouette is enlarged. Pulmonary edema. Small bilateral pleural effusions.    IMPRESSION: Pulmonary edema and small bilateral effusions    LEONIDPERLA ROBBINS M.D., ATTENDING RADIOLOGIST  This document has been electronically signed. Jul 10 2020  7:03PM              IMPRESSION:  1. New onset heart failure. Etiology to be determined, Rule out diastolic heart failure, systolic heart failure, pericardial disease with constriction or pericardial effusion.  2.Long term persistent atrial fibrillation. S/DDD PPM.  3. Hypertension. Stable.   4.Elevated LFTs. Suspect due to passive hepatic congestion.  5. Hyponatremia.  6.History of left breast cancer.  PLAN:  TTE. Continue bumetanide for now. Continue dabigatran, sotalol, verapamil, simvastatin. Follow BMP, LFTS, NT pro BNP, I and O s. Will follow.

## 2020-07-11 NOTE — PROGRESS NOTE ADULT - SUBJECTIVE AND OBJECTIVE BOX
Patient is a 86y old  Female who presents with a chief complaint of Shortness of Breath (11 Jul 2020 07:48)      SUBJECTIVE:   HPI:  85 y/o F PMHx significant for CAD, Afib on DOAC therapy, CHF (unknown EF), s/p pacemaker placement, Hypertension, Hyperlipidemia, and GERD presents to  for further evaluation and management of progressive shortness of breath, dyspnea on exertion, and weight gain worse over the past 3 days. The patient denied any associated chest pain and had a recent diagnosis of CHF by her Cardiologist.  Labs => Na 124, Cl 91, AST/ALT 54/94, proBNP 6877, TnI (-) x 1. In the ED the patient was given ASA 324mg PO x 1. (10 Jul 2020 23:40)    sub: feels improved today; able to walk with less posey; at dry weight of 142; awaiting Serum Na.         REVIEW OF SYSTEMS:    CONSTITUTIONAL: No weakness, fevers or chills  EYES/ENT: No visual changes;  No vertigo or throat pain   NECK: No pain or stiffness  RESPIRATORY: No cough, wheezing, hemoptysis; No shortness of breath  CARDIOVASCULAR: No chest pain or palpitations  GASTROINTESTINAL: No abdominal or epigastric pain. No nausea, vomiting, or hematemesis; No diarrhea or constipation. No melena or hematochezia.  GENITOURINARY: No dysuria, frequency or hematuria  NEUROLOGICAL: No numbness or weakness  SKIN: No itching, burning, rashes, or lesions   All other review of systems is negative unless indicated above    ICU Vital Signs Last 24 Hrs  T(C): 36.5 (11 Jul 2020 08:50), Max: 36.8 (10 Jul 2020 20:22)  T(F): 97.7 (11 Jul 2020 08:50), Max: 98.2 (10 Jul 2020 20:22)  HR: 75 (11 Jul 2020 08:50) (65 - 75)  BP: 133/55 (11 Jul 2020 08:50) (133/55 - 152/77)  BP(mean): 97 (10 Jul 2020 22:11) (97 - 97)  ABP: --  ABP(mean): --  RR: 18 (11 Jul 2020 08:50) (16 - 18)  SpO2: 100% (11 Jul 2020 08:50) (98% - 100%)    I&O's Summary    10 Jul 2020 07:01  -  11 Jul 2020 07:00  --------------------------------------------------------  IN: 0 mL / OUT: 1300 mL / NET: -1300 mL    11 Jul 2020 07:01  -  11 Jul 2020 12:46  --------------------------------------------------------  IN: 0 mL / OUT: 400 mL / NET: -400 mL        CAPILLARY BLOOD GLUCOSE          PHYSICAL EXAM:    Constitutional: NAD, awake and alert, well-developed  HEENT: PERR, EOMI, Normal Hearing, MMM  Neck: Soft and supple, No LAD, No JVD  Respiratory: Breath sounds are clear bilaterally, No wheezing, rales or rhonchi  Cardiovascular: S1 and S2, regular rate and rhythm, no Murmurs, gallops or rubs  Gastrointestinal: Bowel Sounds present, soft, nontender, nondistended, no guarding, no rebound  Extremities: No peripheral edema  Vascular: 2+ peripheral pulses  Neurological: A/O x 3, no focal deficits  Musculoskeletal: 5/5 strength b/l upper and lower extremities  Skin: No rashes    MEDICATIONS:  MEDICATIONS  (STANDING):  anastrozole 1 milliGRAM(s) Oral daily  dabigatran 150 milliGRAM(s) Oral every 12 hours  metoclopramide 10 milliGRAM(s) Oral daily  pantoprazole    Tablet 40 milliGRAM(s) Oral two times a day  simvastatin 20 milliGRAM(s) Oral at bedtime  sotalol 80 milliGRAM(s) Oral two times a day  verapamil  milliGRAM(s) Oral daily      LABS: All Labs Reviewed:                        13.1   9.65  )-----------( 254      ( 10 Jul 2020 17:52 )             37.9     07-10    124<L>  |  91<L>  |  14  ----------------------------<  126<H>  4.7   |  26  |  0.90    Ca    8.9      10 Jul 2020 17:52    TPro  7.5  /  Alb  3.5  /  TBili  0.4  /  DBili  x   /  AST  54<H>  /  ALT  94<H>  /  AlkPhos  93  07-10    PT/INR - ( 10 Jul 2020 17:52 )   PT: 15.7 sec;   INR: 1.36 ratio         PTT - ( 10 Jul 2020 17:52 )  PTT:44.6 sec  CARDIAC MARKERS ( 11 Jul 2020 06:39 )  <0.015 ng/mL / x     / x     / x     / x      CARDIAC MARKERS ( 11 Jul 2020 03:05 )  <0.015 ng/mL / x     / x     / x     / x      CARDIAC MARKERS ( 10 Jul 2020 17:52 )  <0.015 ng/mL / x     / x     / x     / x              Blood Culture:     RADIOLOGY/EKG:    < from: Xray Chest 1 View AP/PA. (07.10.20 @ 17:44) >    IMPRESSION: Pulmonary edema and small bilateral effusions    < end of copied text >

## 2020-07-11 NOTE — PROGRESS NOTE ADULT - ASSESSMENT
87 y/o F PMHx significant for CAD, Afib on DOAC therapy, CHF (unknown EF), s/p pacemaker placement, Hypertension, Hyperlipidemia, and GERD presents to  for further evaluation and management of progressive shortness of breath, dyspnea on exertion, and weight gain worse over the past 3 days. The patient denied any associated chest pain and had a recent diagnosis of CHF by her Cardiologist.  Labs => Na 124, Cl 91, AST/ALT 54/94, proBNP 6877, TnI (-) x 1. In the ED the patient was given ASA 324mg PO x 1.    #Acute Decompensated CHF (EF unknown)  ~serial Pauly/EKGs  ~strict I/Os  ~daily weights  ~2DECHO in the am  ~f/u w/ Cardiology  -cont bumex per cardiology  -monitor Na/LFTs    #Elevated Transaminases  ~likely due to congestive hepatopathy  ~f/u repeat labs in the am  ~f/u ABD U/S : fatty liver    ~AFib  ~cont. Pradaxa 150mg po bid  ~cont. Verapamil ER 180mg po daily  ~cont. Sotalol 80mg po bid    #Hyperlipidemia  ~cont. statin therapy w/ Simvastatin 20mg po qhs    #Vte ppx  ~cont. DOAC therapy as above

## 2020-07-12 ENCOUNTER — TRANSCRIPTION ENCOUNTER (OUTPATIENT)
Age: 85
End: 2020-07-12

## 2020-07-12 VITALS — HEART RATE: 74 BPM | DIASTOLIC BLOOD PRESSURE: 55 MMHG | SYSTOLIC BLOOD PRESSURE: 103 MMHG

## 2020-07-12 LAB
ALBUMIN SERPL ELPH-MCNC: 3 G/DL — LOW (ref 3.3–5)
ALP SERPL-CCNC: 78 U/L — SIGNIFICANT CHANGE UP (ref 40–120)
ALT FLD-CCNC: 59 U/L — SIGNIFICANT CHANGE UP (ref 12–78)
ANION GAP SERPL CALC-SCNC: 9 MMOL/L — SIGNIFICANT CHANGE UP (ref 5–17)
AST SERPL-CCNC: 34 U/L — SIGNIFICANT CHANGE UP (ref 15–37)
BILIRUB SERPL-MCNC: 0.6 MG/DL — SIGNIFICANT CHANGE UP (ref 0.2–1.2)
BUN SERPL-MCNC: 14 MG/DL — SIGNIFICANT CHANGE UP (ref 7–23)
CALCIUM SERPL-MCNC: 8.5 MG/DL — SIGNIFICANT CHANGE UP (ref 8.5–10.1)
CHLORIDE SERPL-SCNC: 93 MMOL/L — LOW (ref 96–108)
CO2 SERPL-SCNC: 26 MMOL/L — SIGNIFICANT CHANGE UP (ref 22–31)
CREAT SERPL-MCNC: 0.79 MG/DL — SIGNIFICANT CHANGE UP (ref 0.5–1.3)
GLUCOSE SERPL-MCNC: 99 MG/DL — SIGNIFICANT CHANGE UP (ref 70–99)
POTASSIUM SERPL-MCNC: 3.6 MMOL/L — SIGNIFICANT CHANGE UP (ref 3.5–5.3)
POTASSIUM SERPL-SCNC: 3.6 MMOL/L — SIGNIFICANT CHANGE UP (ref 3.5–5.3)
PROT SERPL-MCNC: 6.7 GM/DL — SIGNIFICANT CHANGE UP (ref 6–8.3)
SODIUM SERPL-SCNC: 128 MMOL/L — LOW (ref 135–145)

## 2020-07-12 PROCEDURE — 99239 HOSP IP/OBS DSCHRG MGMT >30: CPT

## 2020-07-12 PROCEDURE — 71045 X-RAY EXAM CHEST 1 VIEW: CPT | Mod: 26

## 2020-07-12 RX ORDER — BUMETANIDE 0.25 MG/ML
1 INJECTION INTRAMUSCULAR; INTRAVENOUS DAILY
Refills: 0 | Status: DISCONTINUED | OUTPATIENT
Start: 2020-07-12 | End: 2020-07-12

## 2020-07-12 RX ORDER — BUMETANIDE 0.25 MG/ML
1 INJECTION INTRAMUSCULAR; INTRAVENOUS
Qty: 30 | Refills: 0
Start: 2020-07-12 | End: 2020-08-10

## 2020-07-12 RX ORDER — POTASSIUM CHLORIDE 20 MEQ
40 PACKET (EA) ORAL ONCE
Refills: 0 | Status: COMPLETED | OUTPATIENT
Start: 2020-07-12 | End: 2020-07-12

## 2020-07-12 RX ORDER — POTASSIUM CHLORIDE 20 MEQ
1 PACKET (EA) ORAL
Qty: 30 | Refills: 0
Start: 2020-07-12 | End: 2020-08-10

## 2020-07-12 RX ADMIN — Medication 40 MILLIEQUIVALENT(S): at 14:18

## 2020-07-12 RX ADMIN — BUMETANIDE 1 MILLIGRAM(S): 0.25 INJECTION INTRAMUSCULAR; INTRAVENOUS at 14:18

## 2020-07-12 NOTE — DISCHARGE NOTE PROVIDER - PROVIDER TOKENS
PROVIDER:[TOKEN:[[id number]]],PROVIDER:[TOKEN:[[id number]]],PROVIDER:[TOKEN:[[id number]]]

## 2020-07-12 NOTE — CONSULT NOTE ADULT - ASSESSMENT
86 AF, HTN, HLD, GERD with renal evaluation of Hyponatremia. Per docuemnts was well until 7/5/2020 when she noted severe dyspnea walking from room to room and episodes of lightheadedness and diaphoresis started on torsemide as outpatient after an ER visit for CHF. Continued symptoms so prestented here. Feels much better after 1 dose of IV bumex on 7/11, query regarding hyponatremia.     Hyponatremia  -Likely secondary to volume OL, chf stabilizing with diuresis  -start bumex 1 mg po qday now/with discharge  -No torsemide with discharge  -Limit excess free water  -If discharged, should have labs checked within 48 hours for follow up of Na and K values with diuresis    CHF  -Tele/Dr Mock  -Diuretics as above  -Weights at home    Hypokalemia  -oral dose now    thanks, will follow   d/c with RN staff and Dr Meeks

## 2020-07-12 NOTE — DISCHARGE NOTE PROVIDER - NSDCMRMEDTOKEN_GEN_ALL_CORE_FT
anastrozole 1 mg oral tablet: 1 tab(s) orally once a day  bumetanide 1 mg oral tablet: 1 tab(s) orally once a day  calcium carbonate 450 mg oral gum: 1 tab(s) orally once a day  metoclopramide 10 mg oral tablet: 1 tab(s) orally once a day  Multiple Vitamins oral tablet: 1 tab(s) orally once a day  ocular lubricant ophthalmic solution: 1 drop(s) in each eye 4 times a day, As Needed - for dry eyes  olopatadine 665 mcg/inh nasal spray: 2 spray(s) in each nostril once a day  pantoprazole 40 mg oral delayed release tablet: 1 tab(s) orally 2 times a day  potassium chloride 10 mEq oral tablet, extended release: 1 tab(s) orally once a day   Pradaxa 150 mg oral capsule: 1 cap(s) orally 2 times a day  simvastatin 20 mg oral tablet: 1 tab(s) orally once a day (at bedtime)  sotalol 80 mg oral tablet: 1 tab(s) orally 2 times a day  verapamil 180 mg/24 hours oral capsule, extended release: 1 cap(s) orally once a day  Xiidra 5% ophthalmic solution: 1 drop(s) in each eye 2 times a day

## 2020-07-12 NOTE — DISCHARGE NOTE PROVIDER - HOSPITAL COURSE
· Subjective and Objective:     Patient is a 86y old  Female who presents with a chief complaint of Shortness of Breath (11 Jul 2020 07:48)            SUBJECTIVE:     HPI:    87 y/o F PMHx significant for CAD, Afib on DOAC therapy, CHF (unknown EF), s/p pacemaker placement, Hypertension, Hyperlipidemia, and GERD presents to  for further evaluation and management of progressive shortness of breath, dyspnea on exertion, and weight gain worse over the past 3 days. The patient denied any associated chest pain and had a recent diagnosis of CHF by her Cardiologist.    Labs => Na 124, Cl 91, AST/ALT 54/94, proBNP 6877, TnI (-) x 1. In the ED the patient was given ASA 324mg PO x 1. (10 Jul 2020 23:40)        sub: feels improved today; able to walk with less posey; at dry weight of 142; Na: 128         PHYSICAL EXAM:    ICU Vital Signs Last 24 Hrs    T(C): 36.4 (12 Jul 2020 09:07), Max: 36.7 (11 Jul 2020 21:20)    T(F): 97.6 (12 Jul 2020 09:07), Max: 98.1 (11 Jul 2020 21:20)    HR: 66 (12 Jul 2020 09:07) (65 - 66)    BP: 111/59 (12 Jul 2020 09:07) (108/58 - 111/59)    BP(mean): --    ABP: --    ABP(mean): --    RR: 18 (12 Jul 2020 09:07) (17 - 18)    SpO2: 98% (12 Jul 2020 09:07) (98% - 98%)        Constitutional: NAD, awake and alert, well-developed    HEENT: PERR, EOMI, Normal Hearing, MMM    Neck: Soft and supple, No LAD, No JVD    Respiratory: Breath sounds are clear bilaterally, No wheezing, rales or rhonchi    Cardiovascular: S1 and S2, regular rate and rhythm, no Murmurs, gallops or rubs    Gastrointestinal: Bowel Sounds present, soft, nontender, nondistended, no guarding, no rebound    Extremities: No peripheral edema    Vascular: 2+ peripheral pulses    Neurological: A/O x 3, no focal deficits    Musculoskeletal: 5/5 strength b/l upper and lower extremities    Skin: No rashes    Assessment and Plan:     · Assessment        87 y/o F PMHx significant for CAD, Afib on DOAC therapy, CHF (unknown EF), s/p pacemaker placement, Hypertension, Hyperlipidemia, and GERD presents to  for further evaluation and management of progressive shortness of breath, dyspnea on exertion, and weight gain worse over the past 3 days. The patient denied any associated chest pain and had a recent diagnosis of CHF by her Cardiologist.    Labs => Na 124, Cl 91, AST/ALT 54/94, proBNP 6877, TnI (-) x 1. In the ED the patient was given ASA 324mg PO x 1.        #Acute Decompensated diastolic CHF    -cont bumex 1m po qd with supplemental po potassium    -will need bmp rechecked in 48 hrs    -limit free water intake    ~daily weights    ~2DECHO : noted    ~f/u w/ Cardiology    -cont bumex per cardiology    -monitor Na/LFTs o/p        #Elevated Transaminases 2/2 to passive congestion d/t acute HFpEF    ~likely due to congestive hepatopathy    ~f/u ABD U/S : fatty liver        ~long term chronic afib    ~cont. Pradaxa 150mg po bid    ~cont. Verapamil ER 180mg po daily    ~cont. Sotalol 80mg po bid        #Hyperlipidemia    ~cont. statin therapy w/ Simvastatin 20mg po qhs    dc time: 48 min. PCP will be notified.

## 2020-07-12 NOTE — CONSULT NOTE ADULT - SUBJECTIVE AND OBJECTIVE BOX
Patient is a 86y Female whom presented to the hospital with AF, HTN, HLD, GERD with renal evaluation of Hyponatremia. Per docuemnts was well until 7/5/2020 when she noted severe dyspnea walking from room to room and episodes of lightheadedness and diaphoresis started on torsemide as outpatient after an ER visit for CHF. Continued symptoms so prestented here. Feels much better after 1 dose of IV bumex on 7/11, query regarding hyponatremia.     PAST MEDICAL & SURGICAL HISTORY:  Afib  GERD (gastroesophageal reflux disease)  HTN (hypertension)  High cholesterol  Pacemaker  Cardiac pacemaker      MEDICATIONS  (STANDING):  anastrozole 1 milliGRAM(s) Oral daily  dabigatran 150 milliGRAM(s) Oral every 12 hours  metoclopramide 10 milliGRAM(s) Oral daily  pantoprazole    Tablet 40 milliGRAM(s) Oral two times a day  simvastatin 20 milliGRAM(s) Oral at bedtime  sotalol 80 milliGRAM(s) Oral two times a day  verapamil  milliGRAM(s) Oral daily    MEDICATIONS  (PRN):      Allergies    Hyzaar (Unknown)  Zoloft (Unknown)    Intolerances        SOCIAL HISTORY:  no etoh/cigg    FAMILY HISTORY:  No pertinent family history in first degree relatives      REVIEW OF SYSTEMS:    CONSTITUTIONAL: No weakness, fevers or chills  EYES/ENT: No visual changes;  No vertigo or throat pain   NECK: No pain or stiffness  RESPIRATORY: No cough, wheezing, hemoptysis; Stable shortness of breath  CARDIOVASCULAR: No chest pain or palpitations  GASTROINTESTINAL: No abdominal or epigastric pain. No nausea, vomiting, or hematemesis; No diarrhea or constipation. No melena or hematochezia.  GENITOURINARY: No dysuria, frequency or hematuria  NEUROLOGICAL: No numbness or weakness  SKIN: No itching, burning, rashes, or lesions   All other review of systems is negative unless indicated above.      T(C): , Max: 36.7 (07-11-20 @ 21:20)  T(F): , Max: 98.1 (07-11-20 @ 21:20)  HR: 66 (07-12-20 @ 09:07)  BP: 111/59 (07-12-20 @ 09:07)  BP(mean): --  RR: 18 (07-12-20 @ 09:07)  SpO2: 98% (07-12-20 @ 09:07)  Wt(kg): --    07-11 @ 07:01  -  07-12 @ 07:00  --------------------------------------------------------  IN: 120 mL / OUT: 1900 mL / NET: -1780 mL          PHYSICAL EXAM:    Constitutional: NAD,   HEENT: PERRLA, EOMI,  MMM  Neck: No LAD, No JVD  Respiratory: trace creps  Cardiovascular: S1 and S2, RRR  Gastrointestinal: BS+, soft, NT/ND  Extremities: tr to no peripheral edema  Neurological: A/O x 3, no focal deficits  Psychiatric: Normal mood, normal affect  : No Luz  Skin: No rashes  Access: Not applicable        LABS:                        13.1   9.65  )-----------( 254      ( 10 Jul 2020 17:52 )             37.9     12 Jul 2020 07:28    128    |  93     |  14     ----------------------------<  99     3.6     |  26     |  0.79   11 Jul 2020 11:53    128    |  94     |  12     ----------------------------<  109    3.7     |  24     |  0.68   10 Jul 2020 17:52    124    |  91     |  14     ----------------------------<  126    4.7     |  26     |  0.90     Ca    8.5        12 Jul 2020 07:28  Ca    8.5        11 Jul 2020 11:53  Ca    8.9        10 Jul 2020 17:52    TPro  6.7    /  Alb  3.0    /  TBili  0.6    /  DBili  x      /  AST  x      /  ALT  59     /  AlkPhos  78     12 Jul 2020 07:28  TPro  7.1    /  Alb  3.3    /  TBili  0.4    /  DBili  x      /  AST  39     /  ALT  74     /  AlkPhos  87     11 Jul 2020 11:53  TPro  7.5    /  Alb  3.5    /  TBili  0.4    /  DBili  x      /  AST  54     /  ALT  94     /  AlkPhos  93     10 Jul 2020 17:52          Urine Studies:          RADIOLOGY & ADDITIONAL STUDIES:

## 2020-07-12 NOTE — DISCHARGE NOTE PROVIDER - CARE PROVIDERS DIRECT ADDRESSES
,DirectAddress_Unknown,DirectAddress_Unknown,[email or web address]

## 2020-07-12 NOTE — DISCHARGE NOTE NURSING/CASE MANAGEMENT/SOCIAL WORK - PATIENT PORTAL LINK FT
You can access the FollowMyHealth Patient Portal offered by [facility] by registering at the following website: [email or web address]. By joining [facility]’s FollowMyHealth portal, you will also be able to view your health information using other applications (apps) compatible with our system.

## 2020-07-12 NOTE — DISCHARGE NOTE PROVIDER - NSDCCPCAREPLAN_GEN_ALL_CORE_FT
PRINCIPAL DISCHARGE DIAGNOSIS  Diagnosis: SOB (shortness of breath)  Assessment and Plan of Treatment:

## 2020-07-12 NOTE — PROGRESS NOTE ADULT - SUBJECTIVE AND OBJECTIVE BOX
Patient is a 86y old  Female who presents with a chief complaint of Shortness of Breath (11 Jul 2020 12:45)  HPI:  7/11/2020- Cardiology Consultation: 86 hear old woman was well until 7/5/2020 when she noted severe dyspnea walking from room to room and episodes of lightheadedness and diaphoresis. She felt she had gained weight but did not weigh herself. She entered the ED [facility] 7/7 and was evaluated and discharged without treatment and told the diagnosis was dehydration. Abnormal labs at that time included Na 133, ,  GFR 52n glucose 149, pro BNP 3258. Troponin was normal and the CXR showed no acute disease. Abdominal US showed fatty infiltration of the liver and gallstones. I saw her as a outpatient on 7/8 without change in her dyspnea on exertion At that time bibasilar rhonchi and slight expiratory wheeze was present. I diagnosed her with new onset heart failure with etiology to be determined and begun torsemide 5 mg qd and scheduled TTE. She did not begin torsemide until 7/9 and felt no better on the AM of 7/10 so I advised she increase torsemide to 10 mg qd. She felt no better in the PM on 7/10 so I advised her to enter the ED. Since admission she has received bumetanide 1 mg IV and today she feels significantly improved and denies shortness of breath or lightheadedness.  At no point has she had chest pain, abdominal pain, pedal edema. fever or cough. There is no prior history of CAD or heart failure. She does have a history of HBP, HLD, paroxysmal atrial flutter, sick sinus syndrome post DDD PPM 10/2009. Acute pleuropericarditis did occur post pacemaker insertion 11/2009 and was managed medically. She was diagnosed with left breast cancer 1/2019 and has received treatment with anastrozole therapy alone. No radiation therapy was given. She is followed at a breast clinic at [facility]. She has been on chronic oral anticoagulation with dabigatran.  Meds: Pradaxa 150 mg qd, simvastatin 20 mg qd, sotalol 80 mg bid, verapamil ER 180mg qd, torsemide 5 mg qd, pantoprazole 40 mg bid, anastrozole 1 mg qd.  Allergies- Zoloft.  SH- lives alone, former smoker, no drug or alcohol abuse.     Followup HPI:  7/12- No complaints. Denies shortness of breath, chest pain. Notes fatigue today.   PAST MEDICAL & SURGICAL HISTORY:  Afib  GERD (gastroesophageal reflux disease)  HTN (hypertension)  High cholesterol  Pacemaker  Cardiac pacemaker      Review of symptoms:  Negative except for as noted in today's HPI.      MEDICATIONS  (STANDING):  anastrozole 1 milliGRAM(s) Oral daily  dabigatran 150 milliGRAM(s) Oral every 12 hours  metoclopramide 10 milliGRAM(s) Oral daily  pantoprazole    Tablet 40 milliGRAM(s) Oral two times a day  simvastatin 20 milliGRAM(s) Oral at bedtime  sotalol 80 milliGRAM(s) Oral two times a day  verapamil  milliGRAM(s) Oral daily    MEDICATIONS  (PRN):          Vital Signs Last 24 Hrs  T(C): 36.7 (11 Jul 2020 21:20), Max: 36.7 (11 Jul 2020 21:20)  T(F): 98.1 (11 Jul 2020 21:20), Max: 98.1 (11 Jul 2020 21:20)  HR: 65 (11 Jul 2020 21:20) (65 - 75)  BP: 108/58 (11 Jul 2020 21:20) (108/58 - 133/55)  BP(mean): --  RR: 17 (11 Jul 2020 21:20) (17 - 18)  SpO2: 98% (11 Jul 2020 21:20) (98% - 100%)    I&O's Summary    11 Jul 2020 07:01  -  12 Jul 2020 07:00  --------------------------------------------------------  IN: 120 mL / OUT: 1900 mL / NET: -1780 mL        PHYSICAL EXAM  General Appearance: comfortable  HEENT:   Neck:   Lungs: clear  Heart: S1 and S2 normal, 2/6 systolic murmur LSB  Abdomen: nontender  Extremities: no edema  Neurologic:       INTERPRETATION OF TELEMETRY: V paced with underlying atrial fibrillation.   TTE reviewed: Aortic valve sclerosis, trace AR, MAC with mild MR, normal LV size wall thickness and contractility, LV EF about 55%, LAE, normal RV, EDUARD, PPM in RV and RV, moderate TR, normal PA pressure, no pericardial abnormality.  ECG:    LABS:                          13.1   9.65  )-----------( 254      ( 10 Jul 2020 17:52 )             37.9     07-11    128<L>  |  94<L>  |  12  ----------------------------<  109<H>  3.7   |  24  |  0.68    Ca    8.5      11 Jul 2020 11:53    TPro  7.1  /  Alb  3.3  /  TBili  0.4  /  DBili  x   /  AST  39<H>  /  ALT  74  /  AlkPhos  87  07-11    CARDIAC MARKERS ( 11 Jul 2020 06:39 )  <0.015 ng/mL / x     / x     / x     / x      CARDIAC MARKERS ( 11 Jul 2020 03:05 )  <0.015 ng/mL / x     / x     / x     / x      CARDIAC MARKERS ( 10 Jul 2020 17:52 )  <0.015 ng/mL / x     / x     / x     / x            Pro BNP  5865 07-11 @ 11:53  D Dimer  -- 07-11 @ 11:53  Pro BNP  6877 07-10 @ 17:52  D Dimer  -- 07-10 @ 17:52  Pro BNP  3258 07-07 @ 15:06  D Dimer  -- 07-07 @ 15:06    PT/INR - ( 10 Jul 2020 17:52 )   PT: 15.7 sec;   INR: 1.36 ratio         PTT - ( 10 Jul 2020 17:52 )  PTT:44.6 sec          RADIOLOGY & ADDITIONAL STUDIES:    IMPRESSION:  1. New onset heart failure. Most likely due to diastolic dysfunction. Clinically improved by diureseis. NT pro BNP has declined.  2.Long term persistent atrial fibrillation. S/P DDD PPM.Stable.  3. Hypertension. Stable.   4.Elevated LFTs. Suspect due to passive hepatic congestion. Improved with diuresis.   5. Hyponatremia.  6.History of left breast cancer.  PLAN:  Nephrology consult for hyponatremia is pending. Patient will require chronic loop diuretic therapy to prevent pulmonary congestion. Continue dabigatran, sotalol, verapamil, simvastatin.

## 2020-07-12 NOTE — DISCHARGE NOTE PROVIDER - NSDCFUSCHEDAPPT_GEN_ALL_CORE_FT
CLAY HUTTON ; 08/04/2020 ; [facility] [place] [facility] [address] ; 08/07/2020 ; [facility] [place] [facility] [address]

## 2020-07-13 LAB — OSMOLALITY SERPL: 264 MOSMOL/KG — LOW (ref 280–301)

## 2020-07-24 DIAGNOSIS — I11.0 HYPERTENSIVE HEART DISEASE WITH HEART FAILURE: ICD-10-CM

## 2020-07-24 DIAGNOSIS — I48.92 UNSPECIFIED ATRIAL FLUTTER: ICD-10-CM

## 2020-07-24 DIAGNOSIS — I48.20 CHRONIC ATRIAL FIBRILLATION, UNSPECIFIED: ICD-10-CM

## 2020-07-24 DIAGNOSIS — E78.5 HYPERLIPIDEMIA, UNSPECIFIED: ICD-10-CM

## 2020-07-24 DIAGNOSIS — Z95.0 PRESENCE OF CARDIAC PACEMAKER: ICD-10-CM

## 2020-07-24 DIAGNOSIS — K76.0 FATTY (CHANGE OF) LIVER, NOT ELSEWHERE CLASSIFIED: ICD-10-CM

## 2020-07-24 DIAGNOSIS — I49.5 SICK SINUS SYNDROME: ICD-10-CM

## 2020-07-24 DIAGNOSIS — K21.9 GASTRO-ESOPHAGEAL REFLUX DISEASE WITHOUT ESOPHAGITIS: ICD-10-CM

## 2020-07-24 DIAGNOSIS — E87.1 HYPO-OSMOLALITY AND HYPONATREMIA: ICD-10-CM

## 2020-07-24 DIAGNOSIS — I50.31 ACUTE DIASTOLIC (CONGESTIVE) HEART FAILURE: ICD-10-CM

## 2020-07-29 ENCOUNTER — APPOINTMENT (OUTPATIENT)
Dept: INTERNAL MEDICINE | Facility: CLINIC | Age: 85
End: 2020-07-29

## 2020-08-04 ENCOUNTER — APPOINTMENT (OUTPATIENT)
Dept: ELECTROPHYSIOLOGY | Facility: CLINIC | Age: 85
End: 2020-08-04

## 2020-08-07 ENCOUNTER — APPOINTMENT (OUTPATIENT)
Dept: ELECTROPHYSIOLOGY | Facility: CLINIC | Age: 85
End: 2020-08-07

## 2020-08-19 NOTE — ED PROVIDER NOTE - NS_EDPROVIDERDISPOUSERTYPE_ED_A_ED
Health Maintenance Due   Topic Date Due   • Lung Cancer Screening  05/26/1998       Patient is up to date, no discussion needed.      Recent PHQ 2/9 Score    PHQ 2:  Date Adult PHQ 2 Score Adult PHQ 2 Interpretation   8/19/2020 0 No further screening needed       PHQ 9:       DEPRESSION ASSESSMENT/PLAN:  Depression screening is negative no further plan needed.       Scribe Attestation (For Scribes USE Only)... Scribe Attestation (For Scribes USE Only).../Attending Attestation (For Attendings USE Only)...

## 2020-09-04 PROBLEM — E78.00 PURE HYPERCHOLESTEROLEMIA, UNSPECIFIED: Chronic | Status: ACTIVE | Noted: 2020-07-07

## 2020-09-04 PROBLEM — I10 ESSENTIAL (PRIMARY) HYPERTENSION: Chronic | Status: ACTIVE | Noted: 2020-07-07

## 2020-09-04 PROBLEM — Z95.0 PRESENCE OF CARDIAC PACEMAKER: Chronic | Status: ACTIVE | Noted: 2020-07-07

## 2020-09-04 PROBLEM — I48.91 UNSPECIFIED ATRIAL FIBRILLATION: Chronic | Status: ACTIVE | Noted: 2020-07-07

## 2020-09-04 PROBLEM — K21.9 GASTRO-ESOPHAGEAL REFLUX DISEASE WITHOUT ESOPHAGITIS: Chronic | Status: ACTIVE | Noted: 2020-07-07

## 2020-09-08 ENCOUNTER — APPOINTMENT (OUTPATIENT)
Dept: GASTROENTEROLOGY | Facility: CLINIC | Age: 85
End: 2020-09-08
Payer: MEDICARE

## 2020-09-08 VITALS
HEIGHT: 60 IN | WEIGHT: 132 LBS | DIASTOLIC BLOOD PRESSURE: 77 MMHG | TEMPERATURE: 98.2 F | SYSTOLIC BLOOD PRESSURE: 124 MMHG | BODY MASS INDEX: 25.91 KG/M2 | HEART RATE: 86 BPM

## 2020-09-08 DIAGNOSIS — K64.9 UNSPECIFIED HEMORRHOIDS: ICD-10-CM

## 2020-09-08 DIAGNOSIS — K62.5 HEMORRHAGE OF ANUS AND RECTUM: ICD-10-CM

## 2020-09-08 PROCEDURE — 99214 OFFICE O/P EST MOD 30 MIN: CPT

## 2020-09-08 RX ORDER — PANTOPRAZOLE 40 MG/1
40 TABLET, DELAYED RELEASE ORAL
Qty: 28 | Refills: 3 | Status: DISCONTINUED | COMMUNITY
Start: 2019-12-26 | End: 2020-09-08

## 2020-09-08 RX ORDER — PANTOPRAZOLE 40 MG/1
40 TABLET, DELAYED RELEASE ORAL DAILY
Qty: 90 | Refills: 3 | Status: DISCONTINUED | COMMUNITY
Start: 2020-02-28 | End: 2020-09-08

## 2020-09-08 RX ORDER — PANTOPRAZOLE 40 MG/1
40 TABLET, DELAYED RELEASE ORAL DAILY
Qty: 180 | Refills: 3 | Status: DISCONTINUED | COMMUNITY
Start: 2019-05-07 | End: 2020-09-08

## 2020-09-08 NOTE — ASSESSMENT
[FreeTextEntry1] : 87 yo female with chronic constipation, now requiring fecal disimpaction on a regular bases also w/ gerd worse at night despite ppis. \par \par Impression:\par Chronic constipation/BRBPR likely due to straining/hemorrhoids. \par GERD. \par \par Plan:\par Start linzess and continue fiber for now. \par Increase PO fluids. \par Continue ppi bid and add pepcid at bedtime. \par If symptoms do not improve and/or get worse consider ct scan?\par \par Discussed with Dr. Aranda.  \par \par

## 2020-09-08 NOTE — REVIEW OF SYSTEMS
[As Noted in HPI] : as noted in HPI [Constipation] : constipation [Negative] : Heme/Lymph [FreeTextEntry7] : brbpr

## 2020-09-08 NOTE — PHYSICAL EXAM
[General Appearance - In No Acute Distress] : in no acute distress [General Appearance - Alert] : alert [Outer Ear] : the ears and nose were normal in appearance [Oropharynx] : the oropharynx was normal [Heart Rate And Rhythm] : heart rate was normal and rhythm regular [Heart Sounds] : normal S1 and S2 [Auscultation Breath Sounds / Voice Sounds] : lungs were clear to auscultation bilaterally [Murmurs] : no murmurs [Heart Sounds Gallop] : no gallops [Heart Sounds Pericardial Friction Rub] : no pericardial rub [Abdomen Soft] : soft [Bowel Sounds] : normal bowel sounds [] : no hepato-splenomegaly [Abdomen Tenderness] : non-tender [Abdomen Mass (___ Cm)] : no abdominal mass palpated [Abnormal Walk] : normal gait [Nail Clubbing] : no clubbing  or cyanosis of the fingernails [Musculoskeletal - Swelling] : no joint swelling seen [Motor Tone] : muscle strength and tone were normal [Oriented To Time, Place, And Person] : oriented to person, place, and time [Impaired Insight] : insight and judgment were intact [Affect] : the affect was normal [FreeTextEntry1] : elderly female, appears younger then age.

## 2020-09-08 NOTE — HISTORY OF PRESENT ILLNESS
[de-identified] : 87 yo female with hx of chronic constipation for the past 8-9 years requiring fecal disimpaction.  Now taken fiber capsules up to 6 times daily with some relief but not feeling right.  Does occasionally have BRBPR with straining.  Also with gerd-like symptoms worse at bedtime despite taken PPI bid.  Last colonoscopy was in 2007.  Denies any weight loss. Denies fevers, n/v, melena.

## 2020-09-22 ENCOUNTER — APPOINTMENT (OUTPATIENT)
Dept: GASTROENTEROLOGY | Facility: CLINIC | Age: 85
End: 2020-09-22
Payer: MEDICARE

## 2020-09-22 VITALS
BODY MASS INDEX: 25.91 KG/M2 | WEIGHT: 132 LBS | HEART RATE: 92 BPM | HEIGHT: 60 IN | SYSTOLIC BLOOD PRESSURE: 132 MMHG | DIASTOLIC BLOOD PRESSURE: 82 MMHG

## 2020-09-22 DIAGNOSIS — K21.9 GASTRO-ESOPHAGEAL REFLUX DISEASE W/OUT ESOPHAGITIS: ICD-10-CM

## 2020-09-22 PROCEDURE — 99213 OFFICE O/P EST LOW 20 MIN: CPT

## 2020-09-22 RX ORDER — LINACLOTIDE 145 UG/1
145 CAPSULE, GELATIN COATED ORAL
Qty: 90 | Refills: 3 | Status: DISCONTINUED | COMMUNITY
Start: 2020-09-08 | End: 2020-09-22

## 2020-09-22 RX ORDER — LINACLOTIDE 72 UG/1
72 CAPSULE, GELATIN COATED ORAL
Qty: 90 | Refills: 6 | Status: ACTIVE | COMMUNITY
Start: 2020-09-22

## 2020-09-22 NOTE — REVIEW OF SYSTEMS
[As Noted in HPI] : as noted in HPI [Constipation] : constipation [Heartburn] : heartburn [Negative] : Heme/Lymph

## 2020-09-22 NOTE — HISTORY OF PRESENT ILLNESS
[de-identified] : 87yo female with chronic constipation and gerd.  She is doing overall better with constipation and gerd.  States she occasionally has breakthrough reflux at bedtime requiring tums but her symptoms are more controlled.  She also tired linzess 145mcg but had diarrhea and stopped it. Still some brbpr with wiping but improved.  No abdominal pain, fevers, n/v, melena.

## 2020-09-22 NOTE — PHYSICAL EXAM
[General Appearance - Alert] : alert [General Appearance - In No Acute Distress] : in no acute distress [Auscultation Breath Sounds / Voice Sounds] : lungs were clear to auscultation bilaterally [Heart Rate And Rhythm] : heart rate was normal and rhythm regular [Heart Sounds] : normal S1 and S2 [Heart Sounds Gallop] : no gallops [Murmurs] : no murmurs [Heart Sounds Pericardial Friction Rub] : no pericardial rub [Bowel Sounds] : normal bowel sounds [Abdomen Soft] : soft [Abdomen Tenderness] : non-tender [] : no hepato-splenomegaly [Abdomen Mass (___ Cm)] : no abdominal mass palpated [Abnormal Walk] : normal gait [Nail Clubbing] : no clubbing  or cyanosis of the fingernails [Musculoskeletal - Swelling] : no joint swelling seen [Motor Tone] : muscle strength and tone were normal [Oriented To Time, Place, And Person] : oriented to person, place, and time [Impaired Insight] : insight and judgment were intact [Affect] : the affect was normal

## 2020-09-22 NOTE — ASSESSMENT
[FreeTextEntry1] : 85 yo female with chronic constipation and gerd. \par \par Plan:\par Continue gerd medications as discussed. \par gerd diet. \par Decrease linzess to 72mcg every other day and then daily if tolerates without diarrhea. \par F/u 2-3 weeks. \par \par Discussed with Dr. Aranda.

## 2020-10-06 ENCOUNTER — APPOINTMENT (OUTPATIENT)
Dept: GASTROENTEROLOGY | Facility: CLINIC | Age: 85
End: 2020-10-06
Payer: MEDICARE

## 2020-10-06 VITALS
BODY MASS INDEX: 25.91 KG/M2 | HEART RATE: 92 BPM | DIASTOLIC BLOOD PRESSURE: 78 MMHG | WEIGHT: 132 LBS | TEMPERATURE: 97 F | SYSTOLIC BLOOD PRESSURE: 128 MMHG | HEIGHT: 60 IN

## 2020-10-06 DIAGNOSIS — K59.09 OTHER CONSTIPATION: ICD-10-CM

## 2020-10-06 PROCEDURE — 99213 OFFICE O/P EST LOW 20 MIN: CPT

## 2020-10-06 NOTE — HISTORY OF PRESENT ILLNESS
[de-identified] : 87 yo female with chronic constipation.  Pt was started on linzess 72mcg and reports too much diarrhea and stopped the medication.  She otherwise feels well with no complaints.  No rectal bleeding.  No abdominal pain, n/v.

## 2020-10-06 NOTE — ASSESSMENT
[FreeTextEntry1] : 85 yo female with constipation, to stop linzess 72mcg and start Metamucil at bedtime.  \par Increase PO fluids and use miralax as needed.  F/u 1-2 months. \par Discussed with Dr. Soria.

## 2020-11-04 ENCOUNTER — APPOINTMENT (OUTPATIENT)
Dept: ELECTROPHYSIOLOGY | Facility: CLINIC | Age: 85
End: 2020-11-04

## 2021-05-17 ENCOUNTER — APPOINTMENT (OUTPATIENT)
Dept: ORTHOPEDIC SURGERY | Facility: CLINIC | Age: 86
End: 2021-05-17
Payer: MEDICARE

## 2021-05-17 VITALS
HEART RATE: 86 BPM | HEIGHT: 60 IN | SYSTOLIC BLOOD PRESSURE: 130 MMHG | BODY MASS INDEX: 24.74 KG/M2 | WEIGHT: 126 LBS | DIASTOLIC BLOOD PRESSURE: 77 MMHG

## 2021-05-17 PROCEDURE — 99203 OFFICE O/P NEW LOW 30 MIN: CPT

## 2021-05-17 PROCEDURE — 73100 X-RAY EXAM OF WRIST: CPT | Mod: LT

## 2021-05-18 DIAGNOSIS — Z87.39 PERSONAL HISTORY OF OTHER DISEASES OF THE MUSCULOSKELETAL SYSTEM AND CONNECTIVE TISSUE: ICD-10-CM

## 2021-05-18 DIAGNOSIS — Z86.79 PERSONAL HISTORY OF OTHER DISEASES OF THE CIRCULATORY SYSTEM: ICD-10-CM

## 2021-05-18 DIAGNOSIS — Z85.9 PERSONAL HISTORY OF MALIGNANT NEOPLASM, UNSPECIFIED: ICD-10-CM

## 2021-05-21 NOTE — ADDENDUM
[FreeTextEntry1] : This note was written by Trish Mcmillan on 05/20/2021 acting as scribe for Jorge L Henderson III, MD

## 2021-05-21 NOTE — DISCUSSION/SUMMARY
[de-identified] : The patient presents with DeQuervain's tenosynovitis, left wrist.  At this time I recommend a thumb spica splint, ice and topical anti-inflammatory cream.  She will be reassessed in three or four weeks.

## 2021-05-21 NOTE — PHYSICAL EXAM
[de-identified] : Right wrist:\par Wrist: Range of Motion in Degrees:\par 	                                                Claimant:	                Normal:	\par Dorsiflexion: (Active)               	90-degrees	90-degrees	\par Dorsiflexion: (Passive)	                90-degrees	90-degrees	\par Palmar flexion: (Active)              	90-degrees	90-degrees	\par Palmar flexion: (Passive)              	90-degrees	90-degrees	\par Radial & ulnar deviation(Active)	30-degrees	30-degrees	\par Radial & ulnar deviation(Passive)	30-degrees	30-degrees	\par Pronation/Supination:(Active)    	0-180 degrees	0-180 degrees	\par Pronation/Supination:(Passive)          	0-180 degrees	0-180 degrees	\par \par No instability.  No volar, radial or ulnar tenderness.  No dorsal, radial or ulnar tenderness.  Negative Tinel’s.  Negative Phalen’s.   No tenderness at the TFCC.  No tenderness with ulnar deviation.  No tenderness of the first dorsal compartment.  Negative Finkelstein’s.  No tenderness at the level of the basal joint.  Negative grind.  No muscular atrophy.  No tenderness with flexion and extension of the wrist.  No motor or sensory deficits.  2+ radial and ulnar pulses.  Skin is intact.  No rashes, scars or lesions.  \par  \par Left wrist:\par Wrist:_Range of Motion in Degrees:\par 	                                                Claimant:	                Normal:	\par Dorsiflexion: (Active)               	90-degrees	90-degrees	\par Dorsiflexion: (Passive)	                90-degrees	90-degrees	\par Palmar flexion: (Active)              	90-degrees	90-degrees	\par Palmar flexion: (Passive)              	90-degrees	90-degrees	\par Radial & ulnar deviation(Active)	30-degrees	30-degrees	\par Radial & ulnar deviation(Passive)	30-degrees	30-degrees	\par Pronation/Supination:(Active)    	0-180 degrees	0-180 degrees	\par Pronation/Supination:(Passive)          	0-180 degrees	0-180 degrees	\par \par No instability.  Tenderness over the first dorsal compartment.  No volar, radial or ulnar tenderness.  No dorsal, radial or ulnar tenderness.  Negative Tinel’s.  Negative Phalen’s.   No tenderness at the TFCC.  No tenderness with ulnar deviation  No tenderness of the first dorsal compartment.  Positive Finkelstein’s test.  No tenderness at the level of the basal joint.  Negative grind.  No muscular atrophy.  No motor or sensory deficits.  2+ radial and ulnar pulses.  Skin is intact.  No rashes, scars or lesions.  \par \par   [de-identified] : Gait: normal    Station: normal  [de-identified] : Appearance: Well-developed, well-nourished female  in no acute distress.  [de-identified] : Radiographs, two views of the left wrist, show diffuse degenerative change.

## 2021-05-21 NOTE — HISTORY OF PRESENT ILLNESS
[4] : a current pain level of 4/10 [5] : the ailment interference is 5/10 [(Does not interfere) 0] : the ailment interference is 0/10 (does not interfere) [de-identified] : Pau comes in today with complaints of pain to her left wrist going on for the last several weeks, getting a little worse recently.   This injury is not work related or due to an automobile accident.  The patient states the pain is localized.  The patient describes the pain as dull and sharp.  The patient states a brace make the symptoms better while movement in certain ways makes the symptoms worse.  [de-identified] : None [] : No [de-identified] : None

## 2021-05-21 NOTE — CONSULT LETTER
[Dear  ___] : Dear  [unfilled], [Consult Letter:] : I had the pleasure of evaluating your patient, [unfilled]. [Please see my note below.] : Please see my note below. [Consult Closing:] : Thank you very much for allowing me to participate in the care of this patient.  If you have any questions, please do not hesitate to contact me. [Sincerely,] : Sincerely, [FreeTextEntry3] : Jorge L Henderson III, MD \par ED/jill

## 2021-06-08 ENCOUNTER — APPOINTMENT (OUTPATIENT)
Dept: ORTHOPEDIC SURGERY | Facility: CLINIC | Age: 86
End: 2021-06-08
Payer: MEDICARE

## 2021-06-08 VITALS
BODY MASS INDEX: 24.54 KG/M2 | SYSTOLIC BLOOD PRESSURE: 118 MMHG | WEIGHT: 125 LBS | HEIGHT: 60 IN | DIASTOLIC BLOOD PRESSURE: 71 MMHG | HEART RATE: 86 BPM

## 2021-06-08 DIAGNOSIS — S46.211A STRAIN OF MUSCLE, FASCIA AND TENDON OF OTHER PARTS OF BICEPS, RIGHT ARM, INITIAL ENCOUNTER: ICD-10-CM

## 2021-06-08 DIAGNOSIS — M65.4 RADIAL STYLOID TENOSYNOVITIS [DE QUERVAIN]: ICD-10-CM

## 2021-06-08 PROCEDURE — 99213 OFFICE O/P EST LOW 20 MIN: CPT

## 2021-06-10 NOTE — DISCUSSION/SUMMARY
[de-identified] : At this time, due to DeQuervain's of the left wrist and biceps strain of the right shoulder, the patient is advised to do some more icing and keep the brace on.  We discussed giving her hand therapy for her left hand.  The patient will try the icing and the brace for a few more weeks and then call us to see if she wants occupational therapy.

## 2021-06-10 NOTE — REASON FOR VISIT
[Follow-Up Visit] : a follow-up visit for [FreeTextEntry2] : her left wrist and a new concern to her right shoulder

## 2021-06-10 NOTE — PHYSICAL EXAM
[de-identified] : Left Wrist:\par Range of Motion in Degrees:\par 	                                                Claimant:	                Normal:	\par Dorsiflexion: (Active)               	90-degrees	90-degrees	\par Dorsiflexion: (Passive)	                90-degrees	90-degrees	\par Palmar flexion: (Active)              	90-degrees	90-degrees	\par Palmar flexion: (Passive)              	90-degrees	90-degrees	\par Radial & ulnar deviation(Active)	30-degrees	30-degrees	\par Radial & ulnar deviation(Passive)	30-degrees	30-degrees	\par Pronation/Supination:(Active)    	0-180 degrees	0-180 degrees	\par Pronation/Supination:(Passive)          	0-180 degrees	0-180 degrees	\par \par No instability.  Tenderness over the first dorsal compartment.  No volar, radial or ulnar tenderness.  No dorsal, radial or ulnar tenderness.  Negative Tinel’s.  Negative Phalen’s.   No tenderness at the TFCC.  No tenderness with ulnar deviation  No tenderness of the first dorsal compartment.  Positive Finkelstein’s test.  No tenderness at the level of the basal joint.  Negative grind.  No muscular atrophy.  No motor or sensory deficits.  2+ radial and ulnar pulses.  Skin is intact.  No rashes, scars or lesions.  \par \par Right Shoulder: \par Range of Motion in Degrees:  	\par 	                        Claimant:	 Normal:	\par Abduction (Active)	                180	180 degrees	\par Abduction (Passive)	180	180 degrees	\par Forward elevation (Active):	180	180 degrees	\par Forward elevation (Passive):	180	180 degrees	\par External rotation (Active):	45	45 degrees	\par External rotation (Passive):	45	45 degrees	\par Internal rotation (Active):	L-1	L-1	\par Internal rotation (Passive):	L-1	L-1	\par \par Tenderness along the bicep with pain with resisted bicep motion, supination, pronation and flexion.  No motor weakness to internal rotation, external rotation or abduction in the scapular plane.  Negative crank test.  Negative O’Greg’s test.  Positive Speed’s test.  Positive Yergason’s test.  Negative cross arm test. Negative Hawkin’s sign.  Negative Neer’s sign. Negative apprehension. Negative sulcus sign.  No gross neurological or vascular deficits distally.  Skin is intact.  No rashes, scars or lesions. 2+ radial and ulnar pulses. \par \par Left Shoulder: \par Range of Motion in Degrees:   	\par    	                        Claimant:  	Normal:  	\par Abduction (Active)  	180  	180 degrees  	\par Abduction (Passive)  	180  	180 degrees  	\par Forward elevation (Active):  	180  	180 degrees  	\par Forward elevation (Passive):  180  	180 degrees  	\par External rotation (Active):  	45  	45 degrees  	\par External rotation (Passive):  	45  	45 degrees  	\par Internal rotation (Active):  	L-1  	L-1  	\par Internal rotation (Passive):  	L-1  	L-1  \par 	\par No motor weakness to internal rotation, external rotation or abduction in the scapular plane.  Negative crank test.  Negative O’Greg’s test.  Negative Speed’s test. Negative Yergason’s test.  Negative cross arm test.  No tenderness to palpation at the AC joint. Negative Hawkin’s sign.  Negative Neer’s sign.  Negative apprehension. Negative sulcus sign.  No gross neurological or vascular deficits distally.  Skin is intact.  No rashes, scars or lesions. 2+ radial and ulnar pulses. No extra-articular swelling or tenderness.\par   [de-identified] : Ambulating with a normal gait.  Station:  Normal.  [de-identified] : Appearance:  Well-developed, well-nourished female in no acute distress.\par

## 2021-06-10 NOTE — HISTORY OF PRESENT ILLNESS
[de-identified] : The patient comes in today for DeQuervain's of her left wrist, as well as, right bicep tendon strain. The patient states that the left still hasn't gotten better, but she has been taking  off her brace.  She has been using it, lifting and cooking.

## 2021-06-10 NOTE — ADDENDUM
[FreeTextEntry1] : This note was written by Jenae Cifuentes on 06/10/2021 acting as scribe for Xin Singh, OTR/L, PA

## 2021-06-14 ENCOUNTER — APPOINTMENT (OUTPATIENT)
Dept: RHEUMATOLOGY | Facility: CLINIC | Age: 86
End: 2021-06-14

## 2021-08-24 ENCOUNTER — RX RENEWAL (OUTPATIENT)
Age: 86
End: 2021-08-24

## 2021-08-31 ENCOUNTER — APPOINTMENT (OUTPATIENT)
Dept: DERMATOLOGY | Facility: CLINIC | Age: 86
End: 2021-08-31
Payer: MEDICARE

## 2021-08-31 VITALS — BODY MASS INDEX: 24.54 KG/M2 | WEIGHT: 125 LBS | HEIGHT: 60 IN

## 2021-08-31 DIAGNOSIS — D17.1 BENIGN LIPOMATOUS NEOPLASM OF SKIN AND SUBCUTANEOUS TISSUE OF TRUNK: ICD-10-CM

## 2021-08-31 DIAGNOSIS — L82.1 OTHER SEBORRHEIC KERATOSIS: ICD-10-CM

## 2021-08-31 DIAGNOSIS — L81.4 OTHER MELANIN HYPERPIGMENTATION: ICD-10-CM

## 2021-08-31 PROCEDURE — 99203 OFFICE O/P NEW LOW 30 MIN: CPT

## 2021-08-31 NOTE — HISTORY OF PRESENT ILLNESS
[de-identified] : Pt. presents for skin check;\par c/o few spots of concern;  new growth on back;  no Sxs; \par Severity:  mild  \par Modifying factors:  none\par Associated symptoms:  none\par Context:  no association with activity\par

## 2021-08-31 NOTE — PHYSICAL EXAM
[Full Body Skin Exam Performed] : performed [FreeTextEntry3] : Skin examination performed of the face, neck, trunk, arms, legs; \par The patient is well, alert and oriented, pleasant and cooperative.\par Eyelids, conjunctivae, oral mucosa, digits and nails all normal.  \par No cervical adenopathy.\par \par Normal findings include:\par \par Seborrheic keratoses- trunk, abdomen; \par Angiomas\par Lentigines\par Mobile subcutaneous nodule with normal overlying skin - L upper back\par \par No lesions were suspicious for malignancy. \par \par \par

## 2021-08-31 NOTE — ASSESSMENT
[FreeTextEntry1] : Complete skin examination is negative for malignancy; Multiple new concerns were addressed and discussed.\par Therapeutic options and their risks and benefits; along with multiple diagnostic possibilities were discussed at length;\par risks and benefits of skin biopsy and/or other further study were discussed;\par \par Continue regular exams;\par \par Lipoma; No treatment is required unless this lesion becomes symptomatic.

## 2021-09-28 NOTE — ED ADULT NURSE NOTE - NSFALLRSKPASTHIST_ED_ALL_ED
[facility] Department of Anesthesiology  Post-Anesthesia Note       Name:  Mckayla Meredith                                  Age:  59 y.o. MRN:  [id number]     Last Vitals & Oxygen Saturation: BP (!) 157/61   Pulse 65   Temp 96.9 °F (36.1 °C) (Temporal)   Resp 13   Ht 5' 2\" (1.575 m)   Wt 271 lb (122.9 kg)   SpO2 94%   BMI 49.57 kg/m²   Patient Vitals for the past 4 hrs:   BP Temp Temp src Pulse Resp SpO2   09/28/21 1236 (!) 157/61   65 13 94 %   09/28/21 1232 (!) 152/65 96.9 °F (36.1 °C) Temporal 59 13 96 %   09/28/21 1100 (!) 156/52 97.8 °F (36.6 °C) Temporal 67 16 97 %       Level of consciousness:  Awake, alert    Respiratory: Respirations easy, no distress. Stable. Cardiovascular: Hemodynamically stable. Hydration: Adequate. PONV: Adequately managed. Post-op pain: Adequately controlled. Post-op assessment: Tolerated anesthetic well without complication. Complications:  None.     Gucci Alves MD  September 28, 2021   12:47 PM no

## 2021-12-02 NOTE — ED ADULT NURSE NOTE - NS ED PATIENT SAFETY CONCERN
Chief Complaint   Patient presents with    Discuss Labs    Medication Refill       HPI:  Patient is here for follow-up     Allergy and Medications are reviewed and updated. Past Medical History, Surgical History, and Family History has been reviewed and updated. Review of Systems:  Review of Systems   Constitutional: Negative for chills and fever. HENT: Negative for congestion, sinus pain and sore throat. Eyes: Negative for pain and discharge. Respiratory: Negative for shortness of breath (No new SOb). Cardiovascular: Negative for chest pain. Gastrointestinal: Negative for abdominal pain, blood in stool and nausea. Genitourinary: Negative for flank pain and frequency. Musculoskeletal: Negative for neck pain. Hematological: Does not bruise/bleed easily. Psychiatric/Behavioral: Negative for suicidal ideas. Vitals:    12/02/21 1158   BP: 110/60   Position: Sitting   Pulse: 65   Temp: 98.1 °F (36.7 °C)   TempSrc: Temporal   SpO2: 98%   Weight: 129 lb 14.4 oz (58.9 kg)       Physical Exam  Vitals reviewed. Constitutional:       Appearance: She is well-developed. HENT:      Head: Normocephalic and atraumatic. Mouth/Throat:      Pharynx: No oropharyngeal exudate. Eyes:      Conjunctiva/sclera: Conjunctivae normal.      Pupils: Pupils are equal, round, and reactive to light. Neck:      Vascular: No JVD. Cardiovascular:      Rate and Rhythm: Normal rate and regular rhythm. Pulmonary:      Effort: Pulmonary effort is normal.      Breath sounds: Normal breath sounds. No rales. Abdominal:      General: Bowel sounds are normal.      Palpations: Abdomen is soft. Musculoskeletal:         General: Normal range of motion. Lymphadenopathy:      Cervical: No cervical adenopathy. Skin:     General: Skin is warm and dry. Neurological:      Mental Status: She is alert and oriented to person, place, and time.    Psychiatric:         Behavior: Behavior normal.          Labs :    Lab Results   Component Value Date    WBC 5.3 09/02/2021    HGB 12.6 09/02/2021    HCT 38.3 09/02/2021     09/02/2021    CHOL 156 01/30/2017    TRIG 74 01/30/2017    HDL 85 11/22/2021    ALT 14 11/22/2021    AST 19 11/22/2021     11/22/2021    K 5.4 (H) 11/22/2021     11/22/2021    CREATININE 1.1 (H) 11/22/2021    BUN 20 11/22/2021    CO2 25 11/22/2021    TSH 1.950 11/22/2021    INR 1.0 07/26/2018    GLUF 91 11/22/2021    LABMICR <12.0 09/02/2021     Lab Results   Component Value Date    COLORU Yellow 09/02/2021    NITRU Negative 09/02/2021    GLUCOSEU Negative 09/02/2021    KETUA Negative 09/02/2021    UROBILINOGEN 0.2 09/02/2021    BILIRUBINUR Negative 09/02/2021    BILIRUBINUR neg 11/23/2020           ASSESSMENT     Patient Active Problem List    Diagnosis Date Noted    Anemia in chronic kidney disease 08/24/2021    Hyperkalemia 08/24/2021    Microscopic hematuria - Dr Kodak Davis- work up neg , including CT, cysto 2020 11/23/2020    Anemia 11/23/2020    Hyperparathyroidism ([facility] 07/09/2020    Chronic kidney disease, stage 3 ([facility] [address] 08/29/2018    Prolonged PTT _ Neg work up with Dr Jake Joyce heme Onc Dec 2018  08/29/2018    Palpitation 06/27/2018    Easy bruising 06/27/2018    Other hyperlipidemia 04/26/2018    Other specified hypothyroidism 04/26/2018    Gastroesophageal reflux disease 04/26/2018    Vitamin D deficiency 04/26/2018    Recurrent herpes labialis 04/26/2018    Bipolar 1 disorder, depressed, severe ([address] 10/05/2012    Anxiety disorder 10/05/2012    Mood disorder ([address] 10/04/2012        Diagnosis:     ICD-10-CM    1. Other hyperlipidemia  E78.49    2. Other specified hypothyroidism  E03.8    3. Hyperparathyroidism ([address]  E21.3     Dr Sangeeta Luz Courts   4. Stage 3 chronic kidney disease, unspecified whether stage 3a or 3b CKD (HCC)  N18.30    5. B[facility] disorder, depressed, [facility]  F31.4     Dr Mariella Pérez    6.  Gastroesophageal reflux disease, unspecified whether esophagitis present  K21.9    7. Hyperkalemia  E87.5     Mild, Dr Ruma aMrtino   8. Recurrent herpes labialis  B00.1        PLAN:        Low K diet    Pt labs reviewed      CMP  LIPID  TSH    Reviewed    Pt is concern of SE of her psych meds    She will addressed with her Psych MD    Pt is stable on current medical treatment. Continue current treatment plan    Side effects/Adverse effects/Precautions are reviewed with patient. Low salt, Low Carb diet an low fat diet  Continue medications as advised and take them regularly  Regular exercises as advised    Discussed natural and expected course of this diagnosis and need to alert me if symptoms do not follow expected course, or if any worse. Smoking cessation if applicable, discussed with patient. Following with Dr Ruma Martino        Patient Instructions   The medication list included in this document is our record of what you are currently taking, including any changes that were made at today's visit.  If you find any differences when compared to your medications at home, or have any questions that were not answered at your visit, please contact the office. Return in about 3 months (around 3/2/2022). No

## 2021-12-21 ENCOUNTER — APPOINTMENT (OUTPATIENT)
Dept: DERMATOLOGY | Facility: CLINIC | Age: 86
End: 2021-12-21
Payer: MEDICARE

## 2021-12-21 DIAGNOSIS — L30.9 DERMATITIS, UNSPECIFIED: ICD-10-CM

## 2021-12-21 PROCEDURE — 99214 OFFICE O/P EST MOD 30 MIN: CPT

## 2021-12-21 RX ORDER — PREDNISONE 20 MG/1
20 TABLET ORAL
Qty: 24 | Refills: 0 | Status: ACTIVE | COMMUNITY
Start: 2021-12-21 | End: 1900-01-01

## 2021-12-21 RX ORDER — TRIAMCINOLONE ACETONIDE 1 MG/G
0.1 CREAM TOPICAL
Qty: 1 | Refills: 2 | Status: ACTIVE | COMMUNITY
Start: 2021-12-21 | End: 1900-01-01

## 2021-12-21 NOTE — HISTORY OF PRESENT ILLNESS
[de-identified] : Pt. c/o itching, burning; \par had rash while in hospital 1 month ago;  ? to contract dye\par using po benadryl, OTC preps; \par

## 2021-12-21 NOTE — ASSESSMENT
[FreeTextEntry1] : eczema/asteatosis; \par fairly widespread, in hard to reach area; \par \par Therapeutic options and their risks and benefits; along with multiple diagnostic possibilities were discussed at length; risks and benefits of further study were discussed;\par \par Prednisone taper\par TAC 0.1 cream prn for any recurrences; \par f/u if fails to improve; \par Continue regular exams;

## 2021-12-21 NOTE — PHYSICAL EXAM
[FreeTextEntry3] : Erythematous scaling patches with inflammation \par fairly widespread over back;  arms clear;  no pain, swelling or deformity of joints

## 2022-05-17 NOTE — ED STATDOCS - OBJECTIVE STATEMENT
See progress note. Orbicularis Oris Muscle Flap Text: The defect edges were debeveled with a #15 scalpel blade.  Given that the defect affected the competency of the oral sphincter an obicularis oris muscle flap was deemed most appropriate to restore this competency and normal muscle function.  Using a sterile surgical marker, an appropriate flap was drawn incorporating the defect. The area thus outlined was incised with a #15 scalpel blade.

## 2022-05-26 ENCOUNTER — APPOINTMENT (OUTPATIENT)
Dept: GASTROENTEROLOGY | Facility: CLINIC | Age: 87
End: 2022-05-26
Payer: MEDICARE

## 2022-05-26 VITALS
HEIGHT: 60 IN | SYSTOLIC BLOOD PRESSURE: 122 MMHG | WEIGHT: 112 LBS | BODY MASS INDEX: 21.99 KG/M2 | DIASTOLIC BLOOD PRESSURE: 88 MMHG | HEART RATE: 90 BPM

## 2022-05-26 PROCEDURE — 99214 OFFICE O/P EST MOD 30 MIN: CPT

## 2022-05-26 NOTE — HISTORY OF PRESENT ILLNESS
[FreeTextEntry1] : 88 F with hx of CHF presenting for anemia. Recently had labs done by PCP, and she was noted to have slightly low hemoglobin. Referred to GI for workup. Of note she reports over the last 6 weeks her cardiologist at [facility] has been "changing my water pills". At this time denies chest pain, shortness of breath, nausea, vomiting, abdominal pain, diarrhea, constipation, melena, hematochezia, unintentional weight changes, fevers, chills.

## 2022-05-26 NOTE — ASSESSMENT
[FreeTextEntry1] : 88 F with hx of CHF presenting for anemia. On recent labs by PCP was noted to have slight drop in Hgb. Per patient cardiology has been altering diuretics frequently. ?dilutional cause for low hgb as it was just out of normal range. Patient has no s/s of anemia. BMs have been normal brown. Will obtain repeat labs to assess anemia. Will send results to cardiologist once available. All questions answered. Discussed with Dr. Aranda.  \par \par

## 2022-05-27 LAB
ALBUMIN SERPL ELPH-MCNC: 4.5 G/DL
ALP BLD-CCNC: 115 U/L
ALT SERPL-CCNC: 17 U/L
ANION GAP SERPL CALC-SCNC: 16 MMOL/L
AST SERPL-CCNC: 27 U/L
BASOPHILS # BLD AUTO: 0.06 K/UL
BASOPHILS NFR BLD AUTO: 0.6 %
BILIRUB SERPL-MCNC: 0.4 MG/DL
BUN SERPL-MCNC: 14 MG/DL
CALCIUM SERPL-MCNC: 9.7 MG/DL
CHLORIDE SERPL-SCNC: 93 MMOL/L
CO2 SERPL-SCNC: 23 MMOL/L
CREAT SERPL-MCNC: 0.92 MG/DL
EGFR: 60 ML/MIN/1.73M2
EOSINOPHIL # BLD AUTO: 0.21 K/UL
EOSINOPHIL NFR BLD AUTO: 2.2 %
GLUCOSE SERPL-MCNC: 141 MG/DL
HCT VFR BLD CALC: 34.4 %
HGB BLD-MCNC: 11.3 G/DL
IMM GRANULOCYTES NFR BLD AUTO: 0.3 %
LYMPHOCYTES # BLD AUTO: 1.35 K/UL
LYMPHOCYTES NFR BLD AUTO: 14.4 %
MAN DIFF?: NORMAL
MCHC RBC-ENTMCNC: 26.9 PG
MCHC RBC-ENTMCNC: 32.8 GM/DL
MCV RBC AUTO: 81.9 FL
MONOCYTES # BLD AUTO: 1.05 K/UL
MONOCYTES NFR BLD AUTO: 11.2 %
NEUTROPHILS # BLD AUTO: 6.69 K/UL
NEUTROPHILS NFR BLD AUTO: 71.3 %
PLATELET # BLD AUTO: 301 K/UL
POTASSIUM SERPL-SCNC: 5.3 MMOL/L
PROT SERPL-MCNC: 7.2 G/DL
RBC # BLD: 4.2 M/UL
RBC # FLD: 14.7 %
SODIUM SERPL-SCNC: 132 MMOL/L
WBC # FLD AUTO: 9.39 K/UL

## 2022-08-15 ENCOUNTER — RX RENEWAL (OUTPATIENT)
Age: 87
End: 2022-08-15

## 2022-08-18 ENCOUNTER — APPOINTMENT (OUTPATIENT)
Dept: DERMATOLOGY | Facility: CLINIC | Age: 87
End: 2022-08-18

## 2022-08-30 ENCOUNTER — RESULT REVIEW (OUTPATIENT)
Age: 87
End: 2022-08-30

## 2022-08-30 ENCOUNTER — APPOINTMENT (OUTPATIENT)
Dept: GASTROENTEROLOGY | Facility: CLINIC | Age: 87
End: 2022-08-30

## 2022-08-30 VITALS
DIASTOLIC BLOOD PRESSURE: 63 MMHG | SYSTOLIC BLOOD PRESSURE: 121 MMHG | HEIGHT: 60 IN | WEIGHT: 97 LBS | BODY MASS INDEX: 19.04 KG/M2 | HEART RATE: 88 BPM

## 2022-08-30 DIAGNOSIS — R63.4 ABNORMAL WEIGHT LOSS: ICD-10-CM

## 2022-08-30 PROCEDURE — 99213 OFFICE O/P EST LOW 20 MIN: CPT

## 2022-08-30 RX ORDER — NIFEDIPINE 30 MG/1
30 TABLET, EXTENDED RELEASE ORAL
Refills: 0 | Status: ACTIVE | COMMUNITY

## 2022-08-30 RX ORDER — ISOSORBIDE MONONITRATE 30 MG
30 TABLET, EXTENDED RELEASE 24 HR ORAL
Refills: 0 | Status: ACTIVE | COMMUNITY

## 2022-08-30 RX ORDER — METOPROLOL SUCCINATE 50 MG/1
50 TABLET, EXTENDED RELEASE ORAL
Refills: 0 | Status: ACTIVE | COMMUNITY

## 2022-08-30 RX ORDER — BUMETANIDE 2 MG/1
TABLET ORAL
Refills: 0 | Status: ACTIVE | COMMUNITY

## 2022-08-30 RX ORDER — SIMVASTATIN 20 MG/1
20 TABLET, FILM COATED ORAL
Refills: 0 | Status: DISCONTINUED | COMMUNITY
End: 2022-08-30

## 2022-08-30 RX ORDER — DABIGATRAN ETEXILATE MESYLATE 150 MG/1
150 CAPSULE ORAL
Refills: 0 | Status: DISCONTINUED | COMMUNITY
End: 2022-08-30

## 2022-08-30 RX ORDER — APIXABAN 2.5 MG/1
2.5 TABLET, FILM COATED ORAL
Refills: 0 | Status: ACTIVE | COMMUNITY

## 2022-08-30 RX ORDER — GABAPENTIN 100 MG
100 TABLET ORAL
Refills: 0 | Status: ACTIVE | COMMUNITY

## 2022-08-30 RX ORDER — ATORVASTATIN CALCIUM 40 MG/1
40 TABLET, FILM COATED ORAL
Refills: 0 | Status: ACTIVE | COMMUNITY

## 2022-08-30 RX ORDER — NORMAL SALT TABLETS 1 G/G
TABLET ORAL
Refills: 0 | Status: ACTIVE | COMMUNITY

## 2022-08-30 NOTE — HISTORY OF PRESENT ILLNESS
[de-identified] : Ms. CLAY HUTTON is a 88 year old female with history of a documented weight loss of forty pounds. Patient does note some early satiety but has no complaints of abdominal pain or bowel issues. There are no complaints of heartburn or dysphagia. Patient does note some dyspnea with exertion which has been increasing recently. Laboratory tests drawn by her primary doctor were essentially unremarkable.\par

## 2022-08-30 NOTE — PHYSICAL EXAM
[General Appearance - Alert] : alert [General Appearance - In No Acute Distress] : in no acute distress [FreeTextEntry1] : elderly female in NAD [Auscultation Breath Sounds / Voice Sounds] : lungs were clear to auscultation bilaterally [Heart Rate And Rhythm] : heart rate was normal and rhythm regular [Heart Sounds] : normal S1 and S2 [Heart Sounds Gallop] : no gallops [Murmurs] : no murmurs [Heart Sounds Pericardial Friction Rub] : no pericardial rub [Bowel Sounds] : normal bowel sounds [Abdomen Soft] : soft [Abdomen Tenderness] : non-tender [] : no hepato-splenomegaly [Abdomen Mass (___ Cm)] : no abdominal mass palpated

## 2022-08-30 NOTE — ASSESSMENT
[FreeTextEntry1] : 87 yo female with history of weight loss and history of early satiety. Will arrange CT scan for evaluation and consider further workup pending results.

## 2022-09-08 ENCOUNTER — APPOINTMENT (OUTPATIENT)
Dept: CT IMAGING | Facility: CLINIC | Age: 87
End: 2022-09-08

## 2022-09-08 ENCOUNTER — OUTPATIENT (OUTPATIENT)
Dept: OUTPATIENT SERVICES | Facility: HOSPITAL | Age: 87
LOS: 1 days | End: 2022-09-08
Payer: MEDICARE

## 2022-09-08 DIAGNOSIS — Z95.0 PRESENCE OF CARDIAC PACEMAKER: Chronic | ICD-10-CM

## 2022-09-08 DIAGNOSIS — R63.4 ABNORMAL WEIGHT LOSS: ICD-10-CM

## 2022-09-08 PROCEDURE — 74176 CT ABD & PELVIS W/O CONTRAST: CPT | Mod: 26,MH

## 2022-09-08 PROCEDURE — 74176 CT ABD & PELVIS W/O CONTRAST: CPT

## 2022-09-24 ENCOUNTER — APPOINTMENT (OUTPATIENT)
Dept: CT IMAGING | Facility: CLINIC | Age: 87
End: 2022-09-24

## 2022-09-24 ENCOUNTER — OUTPATIENT (OUTPATIENT)
Dept: OUTPATIENT SERVICES | Facility: HOSPITAL | Age: 87
LOS: 1 days | End: 2022-09-24
Payer: MEDICARE

## 2022-09-24 DIAGNOSIS — J90 PLEURAL EFFUSION, NOT ELSEWHERE CLASSIFIED: ICD-10-CM

## 2022-09-24 DIAGNOSIS — Z95.0 PRESENCE OF CARDIAC PACEMAKER: Chronic | ICD-10-CM

## 2022-09-24 PROCEDURE — 71250 CT THORAX DX C-: CPT | Mod: 26,MH

## 2022-09-24 PROCEDURE — 71250 CT THORAX DX C-: CPT | Mod: MH

## 2023-05-25 ENCOUNTER — APPOINTMENT (OUTPATIENT)
Dept: GASTROENTEROLOGY | Facility: CLINIC | Age: 88
End: 2023-05-25
Payer: MEDICARE

## 2023-05-25 VITALS
WEIGHT: 99 LBS | HEIGHT: 60 IN | BODY MASS INDEX: 19.44 KG/M2 | HEART RATE: 90 BPM | SYSTOLIC BLOOD PRESSURE: 142 MMHG | DIASTOLIC BLOOD PRESSURE: 74 MMHG

## 2023-05-25 PROCEDURE — 99213 OFFICE O/P EST LOW 20 MIN: CPT

## 2023-05-25 RX ORDER — FERROUS GLUCONATE 324(38)MG
324 (38 FE) TABLET ORAL TWICE DAILY
Qty: 60 | Refills: 4 | Status: ACTIVE | COMMUNITY
Start: 2023-05-25 | End: 1900-01-01

## 2023-05-25 NOTE — ASSESSMENT
[FreeTextEntry1] : 88 yo female with history of anemia.  Patient currently has no symptoms and is extremely frail on anticoagulants.  Obtain labs including iron studies and stool guaiacs.  Consider work-up based on \par findings.  Follow-up two weeks.  Patient advised to take supplemental iron.

## 2023-05-25 NOTE — HISTORY OF PRESENT ILLNESS
[FreeTextEntry1] : Ms. CLAY HUTTON is a 89 year old female with history of anemia.  Patient was evaluated last year with episode of weight loss and early satiety.  CT scan of the abdomen was unremarkable.  Patient is currently on anticoagulants.  Patient was seen at another hospital where routine laboratory tests were drawn and patient was noted to be mildly anemic.  There is no complaints of abdominal pain or rectal bleeding.  Patient currently has no symptoms.  Weight has increased since last visit.\par

## 2023-05-25 NOTE — PHYSICAL EXAM

## 2023-05-26 ENCOUNTER — RX CHANGE (OUTPATIENT)
Age: 88
End: 2023-05-26

## 2023-05-26 RX ORDER — CLOTRIMAZOLE 10 MG
1 TROCHE MUCOUS MEMBRANE
Refills: 0 | DISCHARGE
Start: 2023-05-26 | End: 2023-06-09

## 2023-05-30 ENCOUNTER — RX CHANGE (OUTPATIENT)
Age: 88
End: 2023-05-30

## 2023-05-30 LAB
FERRITIN SERPL-MCNC: 23 NG/ML
FOLATE SERPL-MCNC: >20 NG/ML
IRON SATN MFR SERPL: 7 %
IRON SERPL-MCNC: 24 UG/DL
TIBC SERPL-MCNC: 357 UG/DL
UIBC SERPL-MCNC: 332 UG/DL
VIT B12 SERPL-MCNC: 1255 PG/ML

## 2023-05-31 ENCOUNTER — INPATIENT (INPATIENT)
Facility: HOSPITAL | Age: 88
LOS: 0 days | Discharge: ANOTHER HOSPITAL | DRG: 641 | End: 2023-06-01
Attending: FAMILY MEDICINE | Admitting: HOSPITALIST
Payer: MEDICARE

## 2023-05-31 VITALS
TEMPERATURE: 98 F | DIASTOLIC BLOOD PRESSURE: 71 MMHG | RESPIRATION RATE: 24 BRPM | HEART RATE: 43 BPM | SYSTOLIC BLOOD PRESSURE: 157 MMHG | OXYGEN SATURATION: 95 %

## 2023-05-31 DIAGNOSIS — Z95.0 PRESENCE OF CARDIAC PACEMAKER: Chronic | ICD-10-CM

## 2023-05-31 PROCEDURE — 70450 CT HEAD/BRAIN W/O DYE: CPT | Mod: 26,MA

## 2023-05-31 PROCEDURE — 99285 EMERGENCY DEPT VISIT HI MDM: CPT

## 2023-05-31 PROCEDURE — 71045 X-RAY EXAM CHEST 1 VIEW: CPT | Mod: 26

## 2023-05-31 PROCEDURE — 93010 ELECTROCARDIOGRAM REPORT: CPT

## 2023-05-31 PROCEDURE — 74176 CT ABD & PELVIS W/O CONTRAST: CPT | Mod: 26,MA

## 2023-05-31 RX ORDER — FAMOTIDINE 10 MG/ML
20 INJECTION INTRAVENOUS ONCE
Refills: 0 | Status: COMPLETED | OUTPATIENT
Start: 2023-05-31 | End: 2023-05-31

## 2023-05-31 RX ORDER — SODIUM CHLORIDE 9 MG/ML
500 INJECTION INTRAMUSCULAR; INTRAVENOUS; SUBCUTANEOUS ONCE
Refills: 0 | Status: COMPLETED | OUTPATIENT
Start: 2023-05-31 | End: 2023-05-31

## 2023-05-31 RX ORDER — ONDANSETRON 8 MG/1
4 TABLET, FILM COATED ORAL ONCE
Refills: 0 | Status: COMPLETED | OUTPATIENT
Start: 2023-05-31 | End: 2023-06-01

## 2023-05-31 NOTE — ED PROVIDER NOTE - CARE PLAN
Principal Discharge DX:	Syncope and collapse   1 Principal Discharge DX:	Syncope and collapse  Secondary Diagnosis:	Pleural effusion  Secondary Diagnosis:	Proctitis   Principal Discharge DX:	Syncope and collapse  Secondary Diagnosis:	Pleural effusion  Secondary Diagnosis:	Proctitis  Secondary Diagnosis:	Chronic CHF  Secondary Diagnosis:	Hyponatremia  Secondary Diagnosis:	Hypokalemia  Secondary Diagnosis:	Hypocalcemia

## 2023-05-31 NOTE — ED PROVIDER NOTE - CLINICAL SUMMARY MEDICAL DECISION MAKING FREE TEXT BOX
Syncope with complaints of generalized weakness, flu like symptoms, nausea.  Will get EKG, CXR, CT abd/pelvis, CT head, and admit for syncope.

## 2023-05-31 NOTE — ED PROVIDER NOTE - OBJECTIVE STATEMENT
Pt. is a 89 yo F with hx of PPM, CHF on home O2 PRN, HTN, afib presents s/p syncope at home.  Patient's son states he found her lying back on the coffee table with head back.  Patient states she felt dizzy and fainted.  Patient has had upset stomach and nausea for about 3 days and has felt like she has had the flu.  She has had decreased PO intake.  Denies chest pain or SOB.  Denies headache.  Patient came home from work and states he found her lying on top of the coffee table without any injury 3 hours ago.  He thinks maybe she was not wearing her oxygen like she is supposed to.

## 2023-05-31 NOTE — ED PROVIDER NOTE - PROGRESS NOTE DETAILS
JG:  Son understands patient admitted for fainting, electrolyte abnormalities and CHF.  Son wants patient to be transferred to [facility] if further cardiac care needed.

## 2023-05-31 NOTE — ED ADULT TRIAGE NOTE - CHIEF COMPLAINT QUOTE
Patient s/p syncope at home found by son leaning back on couch. Patient c/o pain to right groin. is on Eliquis.

## 2023-06-01 ENCOUNTER — TRANSCRIPTION ENCOUNTER (OUTPATIENT)
Age: 88
End: 2023-06-01

## 2023-06-01 VITALS
OXYGEN SATURATION: 100 % | TEMPERATURE: 98 F | SYSTOLIC BLOOD PRESSURE: 128 MMHG | HEART RATE: 72 BPM | RESPIRATION RATE: 19 BRPM | DIASTOLIC BLOOD PRESSURE: 57 MMHG

## 2023-06-01 DIAGNOSIS — I48.91 UNSPECIFIED ATRIAL FIBRILLATION: ICD-10-CM

## 2023-06-01 DIAGNOSIS — Z95.0 PRESENCE OF CARDIAC PACEMAKER: ICD-10-CM

## 2023-06-01 DIAGNOSIS — E87.1 HYPO-OSMOLALITY AND HYPONATREMIA: ICD-10-CM

## 2023-06-01 DIAGNOSIS — R55 SYNCOPE AND COLLAPSE: ICD-10-CM

## 2023-06-01 DIAGNOSIS — I50.33 ACUTE ON CHRONIC DIASTOLIC (CONGESTIVE) HEART FAILURE: ICD-10-CM

## 2023-06-01 LAB
ADD ON TEST-SPECIMEN IN LAB: SIGNIFICANT CHANGE UP
ADD ON TEST-SPECIMEN IN LAB: SIGNIFICANT CHANGE UP
ALBUMIN SERPL ELPH-MCNC: 2.6 G/DL — LOW (ref 3.3–5)
ALBUMIN SERPL ELPH-MCNC: 2.9 G/DL — LOW (ref 3.3–5)
ALP SERPL-CCNC: 106 U/L — SIGNIFICANT CHANGE UP (ref 40–120)
ALP SERPL-CCNC: 97 U/L — SIGNIFICANT CHANGE UP (ref 40–120)
ALT FLD-CCNC: 27 U/L — SIGNIFICANT CHANGE UP (ref 12–78)
ALT FLD-CCNC: 27 U/L — SIGNIFICANT CHANGE UP (ref 12–78)
ANION GAP SERPL CALC-SCNC: 10 MMOL/L — SIGNIFICANT CHANGE UP (ref 5–17)
ANION GAP SERPL CALC-SCNC: 10 MMOL/L — SIGNIFICANT CHANGE UP (ref 5–17)
ANION GAP SERPL CALC-SCNC: 7 MMOL/L — SIGNIFICANT CHANGE UP (ref 5–17)
AST SERPL-CCNC: 53 U/L — HIGH (ref 15–37)
AST SERPL-CCNC: 53 U/L — HIGH (ref 15–37)
BASOPHILS # BLD AUTO: 0 K/UL — SIGNIFICANT CHANGE UP (ref 0–0.2)
BASOPHILS # BLD AUTO: 0.06 K/UL — SIGNIFICANT CHANGE UP (ref 0–0.2)
BASOPHILS NFR BLD AUTO: 0 % — SIGNIFICANT CHANGE UP (ref 0–2)
BASOPHILS NFR BLD AUTO: 0.5 % — SIGNIFICANT CHANGE UP (ref 0–2)
BILIRUB SERPL-MCNC: 0.5 MG/DL — SIGNIFICANT CHANGE UP (ref 0.2–1.2)
BILIRUB SERPL-MCNC: 0.7 MG/DL — SIGNIFICANT CHANGE UP (ref 0.2–1.2)
BLD GP AB SCN SERPL QL: SIGNIFICANT CHANGE UP
BUN SERPL-MCNC: 17 MG/DL — SIGNIFICANT CHANGE UP (ref 7–23)
BUN SERPL-MCNC: 19 MG/DL — SIGNIFICANT CHANGE UP (ref 7–23)
BUN SERPL-MCNC: 22 MG/DL — SIGNIFICANT CHANGE UP (ref 7–23)
CALCIUM SERPL-MCNC: 7 MG/DL — LOW (ref 8.5–10.1)
CALCIUM SERPL-MCNC: 7.4 MG/DL — LOW (ref 8.5–10.1)
CALCIUM SERPL-MCNC: 7.7 MG/DL — LOW (ref 8.5–10.1)
CHLORIDE SERPL-SCNC: 90 MMOL/L — LOW (ref 96–108)
CHLORIDE SERPL-SCNC: 91 MMOL/L — LOW (ref 96–108)
CHLORIDE SERPL-SCNC: 92 MMOL/L — LOW (ref 96–108)
CO2 SERPL-SCNC: 22 MMOL/L — SIGNIFICANT CHANGE UP (ref 22–31)
CO2 SERPL-SCNC: 24 MMOL/L — SIGNIFICANT CHANGE UP (ref 22–31)
CO2 SERPL-SCNC: 26 MMOL/L — SIGNIFICANT CHANGE UP (ref 22–31)
CREAT SERPL-MCNC: 0.83 MG/DL — SIGNIFICANT CHANGE UP (ref 0.5–1.3)
CREAT SERPL-MCNC: 0.97 MG/DL — SIGNIFICANT CHANGE UP (ref 0.5–1.3)
CREAT SERPL-MCNC: 1.14 MG/DL — SIGNIFICANT CHANGE UP (ref 0.5–1.3)
EGFR: 46 ML/MIN/1.73M2 — LOW
EGFR: 56 ML/MIN/1.73M2 — LOW
EGFR: 67 ML/MIN/1.73M2 — SIGNIFICANT CHANGE UP
EOSINOPHIL # BLD AUTO: 0 K/UL — SIGNIFICANT CHANGE UP (ref 0–0.5)
EOSINOPHIL # BLD AUTO: 0.03 K/UL — SIGNIFICANT CHANGE UP (ref 0–0.5)
EOSINOPHIL NFR BLD AUTO: 0 % — SIGNIFICANT CHANGE UP (ref 0–6)
EOSINOPHIL NFR BLD AUTO: 0.2 % — SIGNIFICANT CHANGE UP (ref 0–6)
GLUCOSE SERPL-MCNC: 105 MG/DL — HIGH (ref 70–99)
GLUCOSE SERPL-MCNC: 123 MG/DL — HIGH (ref 70–99)
GLUCOSE SERPL-MCNC: 97 MG/DL — SIGNIFICANT CHANGE UP (ref 70–99)
HCT VFR BLD CALC: 28.5 % — LOW (ref 34.5–45)
HCT VFR BLD CALC: 30.2 % — LOW (ref 34.5–45)
HGB BLD-MCNC: 9.3 G/DL — LOW (ref 11.5–15.5)
HGB BLD-MCNC: 9.9 G/DL — LOW (ref 11.5–15.5)
HYPOCHROMIA BLD QL: SLIGHT — SIGNIFICANT CHANGE UP
IMM GRANULOCYTES NFR BLD AUTO: 0.4 % — SIGNIFICANT CHANGE UP (ref 0–0.9)
LACTATE SERPL-SCNC: 1.5 MMOL/L — SIGNIFICANT CHANGE UP (ref 0.7–2)
LIDOCAIN IGE QN: 61 U/L — LOW (ref 73–393)
LYMPHOCYTES # BLD AUTO: 0.24 K/UL — LOW (ref 1–3.3)
LYMPHOCYTES # BLD AUTO: 0.51 K/UL — LOW (ref 1–3.3)
LYMPHOCYTES # BLD AUTO: 2 % — LOW (ref 13–44)
LYMPHOCYTES # BLD AUTO: 4.2 % — LOW (ref 13–44)
MANUAL SMEAR VERIFICATION: SIGNIFICANT CHANGE UP
MANUAL SMEAR VERIFICATION: SIGNIFICANT CHANGE UP
MCHC RBC-ENTMCNC: 24.2 PG — LOW (ref 27–34)
MCHC RBC-ENTMCNC: 24.9 PG — LOW (ref 27–34)
MCHC RBC-ENTMCNC: 32.6 GM/DL — SIGNIFICANT CHANGE UP (ref 32–36)
MCHC RBC-ENTMCNC: 32.8 GM/DL — SIGNIFICANT CHANGE UP (ref 32–36)
MCV RBC AUTO: 74.2 FL — LOW (ref 80–100)
MCV RBC AUTO: 75.9 FL — LOW (ref 80–100)
MONOCYTES # BLD AUTO: 1.08 K/UL — HIGH (ref 0–0.9)
MONOCYTES # BLD AUTO: 1.61 K/UL — HIGH (ref 0–0.9)
MONOCYTES NFR BLD AUTO: 13.3 % — SIGNIFICANT CHANGE UP (ref 2–14)
MONOCYTES NFR BLD AUTO: 9 % — SIGNIFICANT CHANGE UP (ref 2–14)
NEUTROPHILS # BLD AUTO: 10.65 K/UL — HIGH (ref 1.8–7.4)
NEUTROPHILS # BLD AUTO: 9.88 K/UL — HIGH (ref 1.8–7.4)
NEUTROPHILS NFR BLD AUTO: 81.4 % — HIGH (ref 43–77)
NEUTROPHILS NFR BLD AUTO: 88 % — HIGH (ref 43–77)
NEUTS BAND # BLD: 1 % — SIGNIFICANT CHANGE UP (ref 0–8)
NRBC # BLD: 0 /100 — SIGNIFICANT CHANGE UP (ref 0–0)
NRBC # BLD: SIGNIFICANT CHANGE UP /100 WBCS (ref 0–0)
NT-PROBNP SERPL-SCNC: HIGH PG/ML (ref 0–450)
OSMOLALITY SERPL: 262 MOSMOL/KG — LOW (ref 280–301)
OVALOCYTES BLD QL SMEAR: SLIGHT — SIGNIFICANT CHANGE UP
OVALOCYTES BLD QL SMEAR: SLIGHT — SIGNIFICANT CHANGE UP
PLAT MORPH BLD: NORMAL — SIGNIFICANT CHANGE UP
PLAT MORPH BLD: NORMAL — SIGNIFICANT CHANGE UP
PLATELET # BLD AUTO: 191 K/UL — SIGNIFICANT CHANGE UP (ref 150–400)
PLATELET # BLD AUTO: 204 K/UL — SIGNIFICANT CHANGE UP (ref 150–400)
POTASSIUM SERPL-MCNC: 3.2 MMOL/L — LOW (ref 3.5–5.3)
POTASSIUM SERPL-MCNC: 3.3 MMOL/L — LOW (ref 3.5–5.3)
POTASSIUM SERPL-MCNC: 3.9 MMOL/L — SIGNIFICANT CHANGE UP (ref 3.5–5.3)
POTASSIUM SERPL-SCNC: 3.2 MMOL/L — LOW (ref 3.5–5.3)
POTASSIUM SERPL-SCNC: 3.3 MMOL/L — LOW (ref 3.5–5.3)
POTASSIUM SERPL-SCNC: 3.9 MMOL/L — SIGNIFICANT CHANGE UP (ref 3.5–5.3)
PROT SERPL-MCNC: 7 GM/DL — SIGNIFICANT CHANGE UP (ref 6–8.3)
PROT SERPL-MCNC: 7.6 GM/DL — SIGNIFICANT CHANGE UP (ref 6–8.3)
RAPID RVP RESULT: SIGNIFICANT CHANGE UP
RBC # BLD: 3.84 M/UL — SIGNIFICANT CHANGE UP (ref 3.8–5.2)
RBC # BLD: 3.98 M/UL — SIGNIFICANT CHANGE UP (ref 3.8–5.2)
RBC # FLD: 16.1 % — HIGH (ref 10.3–14.5)
RBC # FLD: 16.2 % — HIGH (ref 10.3–14.5)
RBC BLD AUTO: ABNORMAL
RBC BLD AUTO: ABNORMAL
SARS-COV-2 RNA SPEC QL NAA+PROBE: SIGNIFICANT CHANGE UP
SODIUM SERPL-SCNC: 123 MMOL/L — LOW (ref 135–145)
SODIUM SERPL-SCNC: 124 MMOL/L — LOW (ref 135–145)
SODIUM SERPL-SCNC: 125 MMOL/L — LOW (ref 135–145)
TROPONIN I, HIGH SENSITIVITY RESULT: 63.93 NG/L — HIGH
TROPONIN I, HIGH SENSITIVITY RESULT: 69.8 NG/L — HIGH
TROPONIN I, HIGH SENSITIVITY RESULT: 84.09 NG/L — HIGH
WBC # BLD: 11.97 K/UL — HIGH (ref 3.8–10.5)
WBC # BLD: 12.14 K/UL — HIGH (ref 3.8–10.5)
WBC # FLD AUTO: 11.97 K/UL — HIGH (ref 3.8–10.5)
WBC # FLD AUTO: 12.14 K/UL — HIGH (ref 3.8–10.5)

## 2023-06-01 PROCEDURE — 80048 BASIC METABOLIC PNL TOTAL CA: CPT

## 2023-06-01 PROCEDURE — 99239 HOSP IP/OBS DSCHRG MGMT >30: CPT

## 2023-06-01 PROCEDURE — 71250 CT THORAX DX C-: CPT | Mod: 26

## 2023-06-01 PROCEDURE — 87077 CULTURE AEROBIC IDENTIFY: CPT

## 2023-06-01 PROCEDURE — 36415 COLL VENOUS BLD VENIPUNCTURE: CPT

## 2023-06-01 PROCEDURE — 87070 CULTURE OTHR SPECIMN AEROBIC: CPT

## 2023-06-01 PROCEDURE — 84300 ASSAY OF URINE SODIUM: CPT

## 2023-06-01 PROCEDURE — 80053 COMPREHEN METABOLIC PANEL: CPT

## 2023-06-01 PROCEDURE — 85025 COMPLETE CBC W/AUTO DIFF WBC: CPT

## 2023-06-01 PROCEDURE — 0225U NFCT DS DNA&RNA 21 SARSCOV2: CPT

## 2023-06-01 PROCEDURE — 83930 ASSAY OF BLOOD OSMOLALITY: CPT

## 2023-06-01 PROCEDURE — 83935 ASSAY OF URINE OSMOLALITY: CPT

## 2023-06-01 PROCEDURE — 99223 1ST HOSP IP/OBS HIGH 75: CPT

## 2023-06-01 PROCEDURE — 84484 ASSAY OF TROPONIN QUANT: CPT

## 2023-06-01 PROCEDURE — 99497 ADVNCD CARE PLAN 30 MIN: CPT

## 2023-06-01 PROCEDURE — 71250 CT THORAX DX C-: CPT

## 2023-06-01 PROCEDURE — 87186 SC STD MICRODIL/AGAR DIL: CPT

## 2023-06-01 PROCEDURE — 93280 PM DEVICE PROGR EVAL DUAL: CPT | Mod: 26

## 2023-06-01 RX ORDER — APIXABAN 2.5 MG/1
1 TABLET, FILM COATED ORAL
Refills: 0 | DISCHARGE

## 2023-06-01 RX ORDER — LANOLIN ALCOHOL/MO/W.PET/CERES
3 CREAM (GRAM) TOPICAL AT BEDTIME
Refills: 0 | Status: DISCONTINUED | OUTPATIENT
Start: 2023-06-01 | End: 2023-06-01

## 2023-06-01 RX ORDER — CALCIUM CARBONATE 500(1250)
1 TABLET ORAL
Qty: 0 | Refills: 0 | DISCHARGE

## 2023-06-01 RX ORDER — ATORVASTATIN CALCIUM 80 MG/1
40 TABLET, FILM COATED ORAL AT BEDTIME
Refills: 0 | Status: DISCONTINUED | OUTPATIENT
Start: 2023-06-01 | End: 2023-06-01

## 2023-06-01 RX ORDER — ANASTROZOLE 1 MG/1
1 TABLET ORAL DAILY
Refills: 0 | Status: DISCONTINUED | OUTPATIENT
Start: 2023-06-01 | End: 2023-06-01

## 2023-06-01 RX ORDER — CALCIUM CARBONATE 500(1250)
1 TABLET ORAL ONCE
Refills: 0 | Status: COMPLETED | OUTPATIENT
Start: 2023-06-01 | End: 2023-06-01

## 2023-06-01 RX ORDER — PANTOPRAZOLE SODIUM 20 MG/1
40 TABLET, DELAYED RELEASE ORAL
Refills: 0 | Status: DISCONTINUED | OUTPATIENT
Start: 2023-06-01 | End: 2023-06-01

## 2023-06-01 RX ORDER — ANASTROZOLE 1 MG/1
1 TABLET ORAL
Qty: 0 | Refills: 0 | DISCHARGE

## 2023-06-01 RX ORDER — VERAPAMIL HCL 240 MG
1 CAPSULE, EXTENDED RELEASE PELLETS 24 HR ORAL
Qty: 0 | Refills: 0 | DISCHARGE

## 2023-06-01 RX ORDER — CLOTRIMAZOLE 10 MG
1 TROCHE MUCOUS MEMBRANE
Refills: 0 | Status: DISCONTINUED | OUTPATIENT
Start: 2023-06-01 | End: 2023-06-01

## 2023-06-01 RX ORDER — METOCLOPRAMIDE HCL 10 MG
1 TABLET ORAL
Qty: 0 | Refills: 0 | DISCHARGE

## 2023-06-01 RX ORDER — CEFEPIME 1 G/1
1 INJECTION, POWDER, FOR SOLUTION INTRAMUSCULAR; INTRAVENOUS
Qty: 5 | Refills: 0
Start: 2023-06-01

## 2023-06-01 RX ORDER — ONDANSETRON 8 MG/1
4 TABLET, FILM COATED ORAL EVERY 6 HOURS
Refills: 0 | Status: DISCONTINUED | OUTPATIENT
Start: 2023-06-01 | End: 2023-06-01

## 2023-06-01 RX ORDER — LIFITEGRAST 50 MG/ML
1 SOLUTION/ DROPS OPHTHALMIC
Qty: 0 | Refills: 0 | DISCHARGE

## 2023-06-01 RX ORDER — POTASSIUM CHLORIDE 20 MEQ
1 PACKET (EA) ORAL
Refills: 0 | DISCHARGE

## 2023-06-01 RX ORDER — POTASSIUM CHLORIDE 20 MEQ
40 PACKET (EA) ORAL ONCE
Refills: 0 | Status: COMPLETED | OUTPATIENT
Start: 2023-06-01 | End: 2023-06-01

## 2023-06-01 RX ORDER — PIPERACILLIN AND TAZOBACTAM 4; .5 G/20ML; G/20ML
3.38 INJECTION, POWDER, LYOPHILIZED, FOR SOLUTION INTRAVENOUS EVERY 8 HOURS
Refills: 0 | Status: DISCONTINUED | OUTPATIENT
Start: 2023-06-01 | End: 2023-06-01

## 2023-06-01 RX ORDER — APIXABAN 2.5 MG/1
2.5 TABLET, FILM COATED ORAL
Refills: 0 | Status: DISCONTINUED | OUTPATIENT
Start: 2023-06-01 | End: 2023-06-01

## 2023-06-01 RX ORDER — BUDESONIDE AND FORMOTEROL FUMARATE DIHYDRATE 160; 4.5 UG/1; UG/1
2 AEROSOL RESPIRATORY (INHALATION)
Refills: 0 | Status: DISCONTINUED | OUTPATIENT
Start: 2023-06-01 | End: 2023-06-01

## 2023-06-01 RX ORDER — PANTOPRAZOLE SODIUM 20 MG/1
1 TABLET, DELAYED RELEASE ORAL
Qty: 0 | Refills: 0 | DISCHARGE

## 2023-06-01 RX ORDER — GABAPENTIN 400 MG/1
2 CAPSULE ORAL
Refills: 0 | DISCHARGE

## 2023-06-01 RX ORDER — BUMETANIDE 0.25 MG/ML
1 INJECTION INTRAMUSCULAR; INTRAVENOUS
Refills: 0 | DISCHARGE

## 2023-06-01 RX ORDER — METOPROLOL TARTRATE 50 MG
1 TABLET ORAL
Refills: 0 | DISCHARGE

## 2023-06-01 RX ORDER — POTASSIUM CHLORIDE 20 MEQ
20 PACKET (EA) ORAL ONCE
Refills: 0 | Status: COMPLETED | OUTPATIENT
Start: 2023-06-01 | End: 2023-06-01

## 2023-06-01 RX ORDER — CEFEPIME 1 G/1
1000 INJECTION, POWDER, FOR SOLUTION INTRAMUSCULAR; INTRAVENOUS EVERY 12 HOURS
Refills: 0 | Status: DISCONTINUED | OUTPATIENT
Start: 2023-06-01 | End: 2023-06-01

## 2023-06-01 RX ORDER — SOTALOL HCL 120 MG
1 TABLET ORAL
Qty: 0 | Refills: 0 | DISCHARGE

## 2023-06-01 RX ORDER — ISOSORBIDE MONONITRATE 60 MG/1
1 TABLET, EXTENDED RELEASE ORAL
Refills: 0 | DISCHARGE

## 2023-06-01 RX ORDER — GABAPENTIN 400 MG/1
200 CAPSULE ORAL AT BEDTIME
Refills: 0 | Status: DISCONTINUED | OUTPATIENT
Start: 2023-06-01 | End: 2023-06-01

## 2023-06-01 RX ORDER — TIOTROPIUM BROMIDE 18 UG/1
2 CAPSULE ORAL; RESPIRATORY (INHALATION) DAILY
Refills: 0 | Status: DISCONTINUED | OUTPATIENT
Start: 2023-06-01 | End: 2023-06-01

## 2023-06-01 RX ORDER — SIMVASTATIN 20 MG/1
1 TABLET, FILM COATED ORAL
Qty: 0 | Refills: 0 | DISCHARGE

## 2023-06-01 RX ORDER — ISOSORBIDE MONONITRATE 60 MG/1
30 TABLET, EXTENDED RELEASE ORAL DAILY
Refills: 0 | Status: DISCONTINUED | OUTPATIENT
Start: 2023-06-01 | End: 2023-06-01

## 2023-06-01 RX ORDER — ACETAMINOPHEN 500 MG
650 TABLET ORAL EVERY 6 HOURS
Refills: 0 | Status: DISCONTINUED | OUTPATIENT
Start: 2023-06-01 | End: 2023-06-01

## 2023-06-01 RX ORDER — PIPERACILLIN AND TAZOBACTAM 4; .5 G/20ML; G/20ML
3.38 INJECTION, POWDER, LYOPHILIZED, FOR SOLUTION INTRAVENOUS ONCE
Refills: 0 | Status: COMPLETED | OUTPATIENT
Start: 2023-06-01 | End: 2023-06-01

## 2023-06-01 RX ORDER — FLUTICASONE FUROATE, UMECLIDINIUM BROMIDE AND VILANTEROL TRIFENATATE 200; 62.5; 25 UG/1; UG/1; UG/1
1 POWDER RESPIRATORY (INHALATION)
Refills: 0 | DISCHARGE

## 2023-06-01 RX ORDER — OLOPATADINE HYDROCHLORIDE 665 UG/1
2 SPRAY, METERED NASAL
Qty: 0 | Refills: 0 | DISCHARGE

## 2023-06-01 RX ORDER — ATORVASTATIN CALCIUM 80 MG/1
1 TABLET, FILM COATED ORAL
Refills: 0 | DISCHARGE

## 2023-06-01 RX ORDER — FUROSEMIDE 40 MG
40 TABLET ORAL ONCE
Refills: 0 | Status: COMPLETED | OUTPATIENT
Start: 2023-06-01 | End: 2023-06-01

## 2023-06-01 RX ORDER — DABIGATRAN ETEXILATE MESYLATE 150 MG/1
1 CAPSULE ORAL
Qty: 0 | Refills: 0 | DISCHARGE

## 2023-06-01 RX ORDER — SODIUM CHLORIDE 5 G/100ML
500 INJECTION, SOLUTION INTRAVENOUS
Refills: 0 | Status: DISCONTINUED | OUTPATIENT
Start: 2023-06-01 | End: 2023-06-01

## 2023-06-01 RX ORDER — SOTALOL HCL 120 MG
80 TABLET ORAL EVERY 12 HOURS
Refills: 0 | Status: DISCONTINUED | OUTPATIENT
Start: 2023-06-01 | End: 2023-06-01

## 2023-06-01 RX ADMIN — FAMOTIDINE 20 MILLIGRAM(S): 10 INJECTION INTRAVENOUS at 00:16

## 2023-06-01 RX ADMIN — Medication 1 TABLET(S): at 11:24

## 2023-06-01 RX ADMIN — ISOSORBIDE MONONITRATE 30 MILLIGRAM(S): 60 TABLET, EXTENDED RELEASE ORAL at 11:24

## 2023-06-01 RX ADMIN — Medication 30 MILLILITER(S): at 12:53

## 2023-06-01 RX ADMIN — SODIUM CHLORIDE 50 MILLILITER(S): 5 INJECTION, SOLUTION INTRAVENOUS at 17:32

## 2023-06-01 RX ADMIN — Medication 650 MILLIGRAM(S): at 13:47

## 2023-06-01 RX ADMIN — PANTOPRAZOLE SODIUM 40 MILLIGRAM(S): 20 TABLET, DELAYED RELEASE ORAL at 17:31

## 2023-06-01 RX ADMIN — Medication 40 MILLIGRAM(S): at 04:40

## 2023-06-01 RX ADMIN — Medication 40 MILLIEQUIVALENT(S): at 17:32

## 2023-06-01 RX ADMIN — Medication 650 MILLIGRAM(S): at 07:51

## 2023-06-01 RX ADMIN — Medication 20 MILLIEQUIVALENT(S): at 04:40

## 2023-06-01 RX ADMIN — Medication 1 TABLET(S): at 06:28

## 2023-06-01 RX ADMIN — ONDANSETRON 4 MILLIGRAM(S): 8 TABLET, FILM COATED ORAL at 00:16

## 2023-06-01 RX ADMIN — APIXABAN 2.5 MILLIGRAM(S): 2.5 TABLET, FILM COATED ORAL at 17:31

## 2023-06-01 RX ADMIN — Medication 80 MILLIGRAM(S): at 17:32

## 2023-06-01 RX ADMIN — SODIUM CHLORIDE 500 MILLILITER(S): 9 INJECTION INTRAMUSCULAR; INTRAVENOUS; SUBCUTANEOUS at 00:16

## 2023-06-01 RX ADMIN — PIPERACILLIN AND TAZOBACTAM 200 GRAM(S): 4; .5 INJECTION, POWDER, LYOPHILIZED, FOR SOLUTION INTRAVENOUS at 04:40

## 2023-06-01 RX ADMIN — Medication 650 MILLIGRAM(S): at 07:21

## 2023-06-01 NOTE — H&P ADULT - HISTORY OF PRESENT ILLNESS
89 yo F with hx of PPM, , CAD, CHF on home O2 PRN, HTN, afibon DOAC , GERD, hyperlipidemia  presents s/p syncope at home.  Patient's son states he found her lying back on the coffee table with head back.  Patient states she felt dizzy and fainted.  Patient has had upset stomach and nausea for about 3 days and has felt like she has had the flu.  She has had decreased PO intake.  Denies chest pain or SOB.  Denies headache.Son came home from work and states he found her lying on top of the coffee table without any injury 3 hours prior to ED Arrival .  He thinks maybe she was not wearing her oxygen like she is supposed to.  In ED found to have leukocytosis, anemia Hb 9.9, afebrile, hemodynamically stable on 2 L NC, with hyponatremia, hypokalemia, - received IV lasix , IVF bolus 500cc, zosyn in ED   CT head no acute intracranial findings,   < from: CT Abdomen and Pelvis No Cont (05.31.23 @ 23:42) >  Mild presacral edema is new, along the posterior aspect of the rectum.   This could represent a mild proctitis or noninflammatory third spacing of   fluid. No underlying sacral fracture.  Cardiomegaly and small pleural effusions again demonstrated. The pleural   effusions have increased slightly in size.  Several small calcified gallstones in the gallbladder which   is distended but with no adjacent inflammation.             88 yo F with hx of PPM, , CAD, CHF on home O2 PRN, HTN, afibon DOAC , GERD, hyperlipidemia  presents s/p syncope at home.  Patient's son states he found her lying back on the coffee table with head back.  Patient states she felt dizzy and fainted.  Patient has had upset stomach and nausea for about 3 days and has felt like she has had the flu.  She has had decreased PO intake.  Denies chest pain or SOB.  Denies headache.Son came home from work and states he found her lying on top of the coffee table without any injury 3 hours prior to ED Arrival .  He thinks maybe she was not wearing her oxygen like she is supposed to.  In ED found to have leukocytosis, anemia Hb 9.9, afebrile, hemodynamically stable on 2 L NC, with hyponatremia, hypokalemia, - received IV lasix , IVF bolus 500cc, zosyn in ED   CT head no acute intracranial findings,   < from: CT Abdomen and Pelvis No Cont (05.31.23 @ 23:42) >  Mild presacral edema is new, along the posterior aspect of the rectum.   This could represent a mild proctitis or noninflammatory third spacing of   fluid. No underlying sacral fracture.  Cardiomegaly and small pleural effusions again demonstrated. The pleural   effusions have increased slightly in size.  Several small calcified gallstones in the gallbladder which   is distended but with no adjacent inflammation.    6/1- denies abd pain, nausea resolved, no further vomiting today, no diarrhea , denies dysuria, currently no sob, no chest pain, on 2 L home O2

## 2023-06-01 NOTE — ED ADULT NURSE REASSESSMENT NOTE - NS ED NURSE REASSESS COMMENT FT1
Pt resting comfortably in bed with no acute distress noted. requested Tylenol for mild abdominal pain. Pt updated on bed status. Call bell within reach.

## 2023-06-01 NOTE — CONSULT NOTE ADULT - ASSESSMENT
89 yo female with hx of CAD, PPM, CHF ( HFpEF), chronic hyponatremia on NaCl tabs as per pt - followed by Dr cameron   [presenting with syncope, w abnormal labs in ED - Anemia, hyponatremia, and hypokalemia  pt was given IV NS bolus and IV lasix in ED      Acute on chronic hyponatremia - followed by Dr Cameron    on Na Cl and Bumex  BNP elevated but does not seem that fluid overloaded  small effusions and presacral edema- due to third spacing ( low Albumin levels)   Na trending down - will give IV 1.5% saline x 250 ml and repeat Na later today      check serum, urine osm, uric acid, and urine Na    may resume NaCl when taking po     d/w RN, Dr Reyes, and Dr White     Thank you for the courtesy of this consult. We will follow this patient with you.   Management is subject to change if new information becomes available or patient condition changes.      88 yo female with hx of CAD, PPM, CHF ( HFpEF), chronic hyponatremia on NaCl tabs as per pt - followed by Dr cameron   [presenting with syncope, w abnormal labs in ED - Anemia, hyponatremia, and hypokalemia  pt was given IV NS bolus and IV lasix in ED      Acute on chronic hyponatremia - followed by Dr Cameron    on Na Cl and Bumex  BNP elevated but does not seem that fluid overloaded  small effusions and presacral edema- due to third spacing ( low Albumin levels)   Na trending down - will give IV 1.5% saline x 250 ml and repeat Na later today      check serum, urine osm, uric acid, and urine Na    may resume NaCl tabs  when taking po     d/w RN, Dr Reyes, and Dr White     Thank you for the courtesy of this consult. We will follow this patient with you.   Management is subject to change if new information becomes available or patient condition changes.

## 2023-06-01 NOTE — H&P ADULT - ASSESSMENT
89 yo F with hx of PPM, , CAD, CHF on home O2 PRN, HTN, afibon DOAC , GERD, hyperlipidemia  presents s/p syncope at home.  Patient's son states he found her lying back on the coffee table with head back.  Patient states she felt dizzy and fainted.  Patient has had upset stomach and nausea for about 3 days and has felt like she has had the flu.  She has had decreased PO intake.  Denies chest pain or SOB.  Denies headache.Son came home from work and states he found her lying on top of the coffee table without any injury 3 hours prior to ED Arrival .  He thinks maybe she was not wearing her oxygen like she is supposed to.  In ED found to have leukocytosis, anemia Hb 9.9, afebrile, hemodynamically stable on 2 L NC, with hyponatremia, hypokalemia, - received IV lasix , IVF bolus 500cc, zosyn in ED  Mild presacral edema is new, along the posterior aspect of the rectum.   This could represent a mild proctitis or noninflammatory third spacing of   fluid. No underlying sacral fracture.  Cardiomegaly and small pleural effusions again demonstrated. The pleural   effusions have increased slightly in size.  Several small calcified gallstones in the gallbladder which   is distended but with no adjacent inflammation.    #Acute Decompensated diastolic CHF  # Hyponatremia likely from CHF exacerbation  #hypokalemia    admit to med surg  IV lasix  nephro consult   cardio consult    #Nausea, GI upset, leukocytosis   # proctitis  #gall stones, distended GB without cholecystitis on CT scan  Zosyn  abd US  GI consult  blood cx  UA Ucx    # Anemia   iron studies monitor      #Elevated Transaminases 2/2 to passive congestion d/t acute HFpEF      long term chronic afib  cw DOAC      Hyperlipidemia 88 yo F with hx of PPM, , CAD, CHF on home O2 PRN, HTN, afibon DOAC , GERD, hyperlipidemia  presents s/p syncope at home.  Patient's son states he found her lying back on the coffee table with head back.  Patient states she felt dizzy and fainted.  Patient has had upset stomach and nausea for about 3 days and has felt like she has had the flu.  She has had decreased PO intake.  Denies chest pain or SOB.  Denies headache.Son came home from work and states he found her lying on top of the coffee table without any injury 3 hours prior to ED Arrival .  He thinks maybe she was not wearing her oxygen like she is supposed to.  In ED found to have leukocytosis, anemia Hb 9.9, afebrile, hemodynamically stable on 2 L NC, with hyponatremia, hypokalemia, - received IV lasix , IVF bolus 500cc, zosyn in ED  Mild presacral edema is new, along the posterior aspect of the rectum.   This could represent a mild proctitis or noninflammatory third spacing of   fluid. No underlying sacral fracture.  Cardiomegaly and small pleural effusions again demonstrated. The pleural   effusions have increased slightly in size.  Several small calcified gallstones in the gallbladder which   is distended but with no adjacent inflammation.    #Acute Decompensated diastolic CHF  # Hyponatremia likely from CHF exacerbation  #hypokalemia  # troponinemia     admit to med surg  IV lasix  EKG atrial paced rhythm, diffused early repolarisation waves   nephro consult   cardio consult    # prolonged Qtc  repeat EKG    #Nausea, GI upset, leukocytosis   # proctitis  #gall stones, distended GB without cholecystitis on CT scan  Zosyn  abd US  GI consult  blood cx  UA Ucx    # Anemia   iron studies monitor      #Elevated Transaminases 2/2 to passive congestion d/t acute HFpEF      long term chronic afib  cw DOAC      Hyperlipidemia 89 yo F with hx of PPM, , CAD, CHF on home O2 PRN, HTN, afibon DOAC , GERD, hyperlipidemia  presents s/p syncope at home.  Patient's son states he found her lying back on the coffee table with head back.  Patient states she felt dizzy and fainted.  Patient has had upset stomach and nausea for about 3 days and has felt like she has had the flu.  She has had decreased PO intake.  Denies chest pain or SOB.  Denies headache.Son came home from work and states he found her lying on top of the coffee table without any injury 3 hours prior to ED Arrival .  He thinks maybe she was not wearing her oxygen like she is supposed to.  In ED found to have leukocytosis, anemia Hb 9.9, afebrile, hemodynamically stable on 2 L NC, with hyponatremia, hypokalemia, - received IV lasix , IVF bolus 500cc, zosyn in ED  Mild presacral edema is new, along the posterior aspect of the rectum.   This could represent a mild proctitis or noninflammatory third spacing of   fluid. No underlying sacral fracture.  Cardiomegaly and small pleural effusions again demonstrated. The pleural   effusions have increased slightly in size.  Several small calcified gallstones in the gallbladder which   is distended but with no adjacent inflammation.    #chronic diastolic CHF - does not appears to be in  overt  volume overload   # syncope   # Hyponatremia likely from dehydration  #hypokalemia  # troponinemia likely demand ischemia    admit to tele  I dw with cardio and nephro - will hold off diuresis and give trail of IVF  and monitor Na   cardio is ok with above plan as does not appear to be in overt volume overload for now  PPM interrogation  EKG atrial paced rhythm, diffused early repolarisation waves   nephro consult   cardio consult    # prolonged Qtc  repeat EKG    #Nausea, , leukocytosis   # doubt proctitis clinically  #gall stones, distended GB without cholecystitis on CT scan- no abd pain, LFT stable  monitor off abx   check CT chest r/o pna   abd US  GI consult- dw with GI NP teresa who reviewed chart and scan unlikely proctitis - per GI no need for Gi consult and no need for abx from GI stand point  blood cx  UA Ucx    # Anemia   iron studies monitor        long term chronic afib  cw DOAC     Advanced care directives -pt says she is DNR/DNI and does not want to be resuscitated with chest compressions if heart stops  and does not want  intubation/mechanical ventilation if need arises.Currently pt has capacity to make her own decisions ,   Advanced care directives time spent 20mins     Family wants pt transferred to [facility]   I dw with patient cardiology  Dr garcia/NP mahesh [phone] who have initiated transfer throat [facility], I provided then with my cell phone number

## 2023-06-01 NOTE — DISCHARGE NOTE NURSING/CASE MANAGEMENT/SOCIAL WORK - PATIENT PORTAL LINK FT
You can access the FollowMyHealth Patient Portal offered by [facility] by registering at the following website: [email or web address]. By joining [employer]’s FollowMyHealth portal, you will also be able to view your health information using other applications (apps) compatible with our system.

## 2023-06-01 NOTE — DISCHARGE NOTE PROVIDER - NSDCCPCAREPLAN_GEN_ALL_CORE_FT
PRINCIPAL DISCHARGE DIAGNOSIS  Diagnosis: Syncope and collapse  Assessment and Plan of Treatment: your bumex has been temporarily held due to hyponatremia, administered trial of IV fluid  cardio and nephrology follow up      SECONDARY DISCHARGE DIAGNOSES  Diagnosis: Pleural effusion  Assessment and Plan of Treatment:     Diagnosis: Proctitis  Assessment and Plan of Treatment:     Diagnosis: Chronic CHF  Assessment and Plan of Treatment:     Diagnosis: Hyponatremia  Assessment and Plan of Treatment:     Diagnosis: Hypokalemia  Assessment and Plan of Treatment:     Diagnosis: Hypocalcemia  Assessment and Plan of Treatment:

## 2023-06-01 NOTE — H&P ADULT - NSHPPHYSICALEXAM_GEN_ALL_CORE
PHYSICAL EXAM:    Daily     Daily     ICU Vital Signs Last 24 Hrs  T(C): 36.6 (01 Jun 2023 13:12), Max: 37 (01 Jun 2023 05:45)  T(F): 97.9 (01 Jun 2023 13:12), Max: 98.6 (01 Jun 2023 05:45)  HR: 74 (01 Jun 2023 11:28) (43 - 86)  BP: 123/54 (01 Jun 2023 13:12) (123/54 - 167/82)  BP(mean): 81 (01 Jun 2023 11:28) (81 - 102)  ABP: --  ABP(mean): --  RR: 19 (01 Jun 2023 13:12) (18 - 24)  SpO2: 95% (01 Jun 2023 13:12) (95% - 99%)    O2 Parameters below as of 01 Jun 2023 13:12  Patient On (Oxygen Delivery Method): nasal cannula  O2 Flow (L/min): 2          Constitutional: NAD, appears weak and ill   HEENT: Atraumatic, BG, Normal, No congestion  Respiratory: diffused inspiratory crackles, air entry present B/L, no wheezing   Cardiovascular: N S1S2;   Gastrointestinal: Abdomen soft, non tender, Bowel Ssounds present  Extremities: 1+ pedal edema, peripheral pulses present  Neurological: awake, alert follows commands  no gross focal motor deficits  Skin: Non cellulitic, no rash, ulcers

## 2023-06-01 NOTE — DISCHARGE NOTE PROVIDER - PROVIDER TOKENS
FREE:[LAST:[Cardiology Dr Valentin [phone]],PHONE:[(   )    -],FAX:[(   )    -]],PROVIDER:[TOKEN:[[id number]]]

## 2023-06-01 NOTE — DISCHARGE NOTE NURSING/CASE MANAGEMENT/SOCIAL WORK - NSDCPEFALRISK_GEN_ALL_CORE
For information on Fall & Injury Prevention, visit: [email or web address] OR  [email or web address] OR  https://www.cdc.gov/steadi/patient.html

## 2023-06-01 NOTE — ED ADULT NURSE REASSESSMENT NOTE - NS ED NURSE REASSESS COMMENT FT1
spoke with daughter on the phone, ([phone]). Daughter requests transfer to [facility] due to pt's Cardiologists, doctor Saunders [phone] affiliation with [facility]. Also, due to pt. negative experience with [facility]. Daughter informed about the reason pt. was brought in to [facility], and explained process of setting transfer for non-medical necessity.

## 2023-06-01 NOTE — DISCHARGE NOTE PROVIDER - NSDCMRMEDTOKEN_GEN_ALL_CORE_FT
anastrozole 1 mg oral tablet: 1 tab(s) orally once a day  apixaban 2.5 mg oral tablet: 1 tab(s) orally 2 times a day  atorvastatin 40 mg oral tablet: 1 tab(s) orally once a day (at bedtime)  bumetanide 2 mg oral tablet: 1 tab(s) orally 2 times a day as needed for swelling  clotrimazole 10 mg oral lozenge: 1 lozenge orally 5 times a day for 14 days  gabapentin 100 mg oral capsule: 2 cap(s) orally once a day (at bedtime)  isosorbide mononitrate 30 mg oral tablet, extended release: 1 tab(s) orally once a day  Multiple Vitamins oral tablet: 1 tab(s) orally once a day  pantoprazole 40 mg oral delayed release tablet: 1 tab(s) orally 2 times a day  potassium chloride 10 mEq oral tablet, extended release: 1 tab(s) orally once a day  sotalol 80 mg oral tablet: 1 tab(s) orally 2 times a day  Trelegy Ellipta 200 mcg-62.5 mcg-25 mcg/inh inhalation powder: 1 puff(s) inhaled 2 times a day  Xiidra 5% ophthalmic solution: 1 drop(s) in each eye 2 times a day   anastrozole 1 mg oral tablet: 1 tab(s) orally once a day  apixaban 2.5 mg oral tablet: 1 tab(s) orally 2 times a day  atorvastatin 40 mg oral tablet: 1 tab(s) orally once a day (at bedtime)  clotrimazole 10 mg oral lozenge: 1 lozenge orally 5 times a day for 14 days  gabapentin 100 mg oral capsule: 2 cap(s) orally once a day (at bedtime)  isosorbide mononitrate 30 mg oral tablet, extended release: 1 tab(s) orally once a day  Multiple Vitamins oral tablet: 1 tab(s) orally once a day  pantoprazole 40 mg oral delayed release tablet: 1 tab(s) orally 2 times a day  sotalol 80 mg oral tablet: 1 tab(s) orally 2 times a day  Trelegy Ellipta 200 mcg-62.5 mcg-25 mcg/inh inhalation powder: 1 puff(s) inhaled 2 times a day  Xiidra 5% ophthalmic solution: 1 drop(s) in each eye 2 times a day   anastrozole 1 mg oral tablet: 1 tab(s) orally once a day  apixaban 2.5 mg oral tablet: 1 tab(s) orally 2 times a day  atorvastatin 40 mg oral tablet: 1 tab(s) orally once a day (at bedtime)  cefepime 1 g intravenous injection: 1 gram(s) intravenous every 12 hours  clotrimazole 10 mg oral lozenge: 1 lozenge orally 5 times a day for 14 days  gabapentin 100 mg oral capsule: 2 cap(s) orally once a day (at bedtime)  isosorbide mononitrate 30 mg oral tablet, extended release: 1 tab(s) orally once a day  Multiple Vitamins oral tablet: 1 tab(s) orally once a day  pantoprazole 40 mg oral delayed release tablet: 1 tab(s) orally 2 times a day  sotalol 80 mg oral tablet: 1 tab(s) orally 2 times a day  Trelegy Ellipta 200 mcg-62.5 mcg-25 mcg/inh inhalation powder: 1 puff(s) inhaled 2 times a day  Xiidra 5% ophthalmic solution: 1 drop(s) in each eye 2 times a day

## 2023-06-01 NOTE — DISCHARGE NOTE PROVIDER - NSDCFUSCHEDAPPT_GEN_ALL_CORE_FT
[facility]  GASTRO [address]  Scheduled Appointment: 06/08/2023

## 2023-06-01 NOTE — PHARMACOTHERAPY INTERVENTION NOTE - COMMENTS
Med history complete, reviewed medications and allergies with patient, patient able to provide medicatino history with prompting of medication names, confirmed medication list with doctor first med profile, all medication related questions answered   Med history complete, reviewed medications and allergies with patient, patient able to provide medication history with prompting of medication names, called patients son Andrea [phone] with no answer, confirmed medication list with doctor first med profile, all medication related questions answered

## 2023-06-01 NOTE — DISCHARGE NOTE PROVIDER - HOSPITAL COURSE
88 yo F with hx of PPM, , CAD, CHF on home O2 PRN, HTN, afibon DOAC , GERD, hyperlipidemia  presents s/p syncope at home.  Patient's son states he found her lying back on the coffee table with head back.  Patient states she felt dizzy and fainted.  Patient has had upset stomach and nausea for about 3 days and has felt like she has had the flu.  She has had decreased PO intake.  Denies chest pain or SOB.  Denies headache.Son came home from work and states he found her lying on top of the coffee table without any injury 3 hours prior to ED Arrival .  He thinks maybe she was not wearing her oxygen like she is supposed to.  In ED found to have leukocytosis, anemia Hb 9.9, afebrile, hemodynamically stable on 2 L NC, with hyponatremia, hypokalemia, - received IV lasix , IVF bolus 500cc, zosyn in ED   CT head no acute intracranial findings,   < from: CT Abdomen and Pelvis No Cont (05.31.23 @ 23:42) >  Mild presacral edema is new, along the posterior aspect of the rectum.   This could represent a mild proctitis or noninflammatory third spacing of   fluid. No underlying sacral fracture.  Cardiomegaly and small pleural effusions again demonstrated. The pleural   effusions have increased slightly in size.  Several small calcified gallstones in the gallbladder which   is distended but with no adjacent inflammation.    6/1- denies abd pain, nausea resolved, no further vomiting today, no diarrhea , denies dysuria, currently no sob, no chest pain, on 2 L home O2  A/P  #chronic diastolic CHF - does not appears to be in  overt  volume overload   # syncope   # Hyponatremia likely from dehydration  #hypokalemia  # troponinemia likely demand ischemia    admit to tele  I dw with cardio and nephro - will hold off diuresis and give trail of IVF  and monitor Na   cardio is ok with above plan as does not appear to be in overt volume overload for now  PPM interrogation  EKG atrial paced rhythm, diffused early repolarisation waves   nephro consult   cardio consult    # prolonged Qtc  monitor    #Nausea, , leukocytosis   # doubt proctitis clinically  #gall stones, distended GB without cholecystitis on CT scan- no abd pain, LFT stable  monitor off abx   check CT chest r/o pna   abd US  GI consult- dw with GI NP teresa who reviewed chart and scan unlikely proctitis - per GI no need for Gi consult and no need for abx from GI stand point  blood cx  UA Ucx    # Anemia   iron studies monitor        long term chronic afib  cw DOAC     Advanced care directives -pt says she is DNR/DNI and does not want to be resuscitated with chest compressions if heart stops  and does not want  intubation/mechanical ventilation if need arises.Currently pt has capacity to make her own decisions ,   Advanced care directives time spent 20mins     Family wants pt transferred to [facility]   I dw with patient cardiology  Dr garcia/NP mahesh [phone] who have initiated transfer throat [facility], I provided then with my cell phone number .      T(C): 36.6 (01 Jun 2023 13:12), Max: 37 (01 Jun 2023 05:45)  T(F): 97.9 (01 Jun 2023 13:12), Max: 98.6 (01 Jun 2023 05:45)  HR: 74 (01 Jun 2023 11:28) (43 - 86)  BP: 123/54 (01 Jun 2023 13:12) (123/54 - 167/82)  BP(mean): 81 (01 Jun 2023 11:28) (81 - 102)  ABP: --  ABP(mean): --  RR: 19 (01 Jun 2023 13:12) (18 - 24)  SpO2: 95% (01 Jun 2023 13:12) (95% - 99%)    O2 Parameters below as of 01 Jun 2023 13:12  Patient On (Oxygen Delivery Method): nasal cannula  O2 Flow (L/min): 2          Constitutional: NAD, appears weak and ill   HEENT: Atraumatic, BG, Normal, No congestion  Respiratory: diffused inspiratory crackles, air entry present B/L, no wheezing   Cardiovascular: N S1S2; Gastrointestinal: Abdomen soft, non tender, Bowel Ssounds present  Extremities: 1+ pedal edema, peripheral pulses present  Neurological: awake, alert follows commands  no gross focal motor deficits  Skin: Non cellulitic, no rash, ulcers    discharge time 45 mins   i dw with patient and daughter [id number]   89 yo F with hx of PPM, , CAD, CHF on home O2 PRN, HTN, afibon DOAC , GERD, hyperlipidemia  presents s/p syncope at home.  Patient's son states he found her lying back on the coffee table with head back.  Patient states she felt dizzy and fainted.  Patient has had upset stomach and nausea for about 3 days and has felt like she has had the flu.  She has had decreased PO intake.  Denies chest pain or SOB.  Denies headache.Son came home from work and states he found her lying on top of the coffee table without any injury 3 hours prior to ED Arrival .  He thinks maybe she was not wearing her oxygen like she is supposed to.  In ED found to have leukocytosis, anemia Hb 9.9, afebrile, hemodynamically stable on 2 L NC, with hyponatremia, hypokalemia, - received IV lasix , IVF bolus 500cc, zosyn in ED   CT head no acute intracranial findings,   < from: CT Abdomen and Pelvis No Cont (05.31.23 @ 23:42) >  Mild presacral edema is new, along the posterior aspect of the rectum.   This could represent a mild proctitis or noninflammatory third spacing of   fluid. No underlying sacral fracture.  Cardiomegaly and small pleural effusions again demonstrated. The pleural   effusions have increased slightly in size.  Several small calcified gallstones in the gallbladder which   is distended but with no adjacent inflammation.    6/1- denies abd pain, nausea resolved, no further vomiting today, no diarrhea , denies dysuria, currently no sob, no chest pain, on 2 L home O2  A/P  #chronic diastolic CHF - does not appears to be in  overt  volume overload   # syncope   # Hyponatremia likely from dehydration  #hypokalemia  # troponinemia likely demand ischemia    admit to tele  I dw with cardio and nephro - will hold off diuresis and give trail of IVF  and monitor Na   cardio is ok with above plan as does not appear to be in overt volume overload for now  PPM interrogation  EKG atrial paced rhythm, diffused early repolarisation waves   nephro consult   cardio consult    # prolonged Qtc  monitor    #Nausea, , leukocytosis   # doubt proctitis clinically  #gall stones, distended GB without cholecystitis on CT scan- no abd pain, LFT stable    check CT chest  shows tree in bud opacities - will start cefepime , blood cx , sputum cx , legionella   abd US  GI consult- dw with GI NP teresa who reviewed chart and scan unlikely proctitis - per GI no need for Gi consult and no need for abx from GI stand point  blood cx  UA Ucx    # Anemia   iron studies monitor        long term chronic afib  cw DOAC     Advanced care directives -pt says she is DNR/DNI and does not want to be resuscitated with chest compressions if heart stops  and does not want  intubation/mechanical ventilation if need arises.Currently pt has capacity to make her own decisions ,   Advanced care directives time spent 20mins     Family wants pt transferred to [facility]   I dw with patient cardiology  Dr garcia/NP mahesh [phone] who have initiated transfer throat [facility], I provided then with my cell phone number .      T(C): 36.6 (01 Jun 2023 13:12), Max: 37 (01 Jun 2023 05:45)  T(F): 97.9 (01 Jun 2023 13:12), Max: 98.6 (01 Jun 2023 05:45)  HR: 74 (01 Jun 2023 11:28) (43 - 86)  BP: 123/54 (01 Jun 2023 13:12) (123/54 - 167/82)  BP(mean): 81 (01 Jun 2023 11:28) (81 - 102)  ABP: --  ABP(mean): --  RR: 19 (01 Jun 2023 13:12) (18 - 24)  SpO2: 95% (01 Jun 2023 13:12) (95% - 99%)    O2 Parameters below as of 01 Jun 2023 13:12  Patient On (Oxygen Delivery Method): nasal cannula  O2 Flow (L/min): 2          Constitutional: NAD, appears weak and ill   HEENT: Atraumatic, BG, Normal, No congestion  Respiratory: diffused inspiratory crackles, air entry present B/L, no wheezing   Cardiovascular: N S1S2; Gastrointestinal: Abdomen soft, non tender, Bowel Ssounds present  Extremities: 1+ pedal edema, peripheral pulses present  Neurological: awake, alert follows commands  no gross focal motor deficits  Skin: Non cellulitic, no rash, ulcers    discharge time 45 mins   i dw with patient and daughter [phone]   89 yo F with hx of PPM, , CAD, CHF on home O2 PRN, HTN, afibon DOAC , GERD, hyperlipidemia  presents s/p syncope at home.  Patient's son states he found her lying back on the coffee table with head back.  Patient states she felt dizzy and fainted.  Patient has had upset stomach and nausea for about 3 days and has felt like she has had the flu.  She has had decreased PO intake.  Denies chest pain or SOB.  Denies headache.Son came home from work and states he found her lying on top of the coffee table without any injury 3 hours prior to ED Arrival .  He thinks maybe she was not wearing her oxygen like she is supposed to.  In ED found to have leukocytosis, anemia Hb 9.9, afebrile, hemodynamically stable on 2 L NC, with hyponatremia, hypokalemia, - received IV lasix , IVF bolus 500cc, zosyn in ED   CT head no acute intracranial findings,   < from: CT Abdomen and Pelvis No Cont (05.31.23 @ 23:42) >  Mild presacral edema is new, along the posterior aspect of the rectum.   This could represent a mild proctitis or noninflammatory third spacing of   fluid. No underlying sacral fracture.  Cardiomegaly and small pleural effusions again demonstrated. The pleural   effusions have increased slightly in size.  Several small calcified gallstones in the gallbladder which   is distended but with no adjacent inflammation.    6/1- denies abd pain, nausea resolved, no further vomiting today, no diarrhea , denies dysuria, currently no sob, no chest pain, on 2 L home O2  A/P  #chronic diastolic CHF - does not appears to be in  overt  volume overload   # syncope   # Hyponatremia likely from dehydration  #hypokalemia  # troponinemia likely demand ischemia,    admit to tele  I dw with cardio and nephro - will hold off diuresis and give trail of IVF  and monitor Na   cardio is ok with above plan as does not appear to be in overt volume overload for now  PPM interrogation  EKG atrial paced rhythm, diffused early repolarisation waves   nephro consult   cardio consult    # prolonged Qtc  monitor    #Nausea, , leukocytosis   # doubt proctitis clinically  #gall stones, distended GB without cholecystitis on CT scan- no abd pain, LFT stable    check CT chest  shows tree in bud opacities - will start cefepime , blood cx , sputum cx , legionella   abd US  GI consult- dw with GI NP teresa who reviewed chart and scan unlikely proctitis - per GI no need for Gi consult and no need for abx from GI stand point  blood cx  UA Ucx    # Anemia   iron studies monitor        long term chronic afib  cw DOAC     Advanced care directives -pt says she is DNR/DNI and does not want to be resuscitated with chest compressions if heart stops  and does not want  intubation/mechanical ventilation if need arises.Currently pt has capacity to make her own decisions ,   Advanced care directives time spent 20mins     Family wants pt transferred to [facility]   I dw with patient cardiology  Dr garcia/NP mahesh [phone] who have initiated transfer throat [facility], I provided then with my cell phone number .      T(C): 36.6 (01 Jun 2023 13:12), Max: 37 (01 Jun 2023 05:45)  T(F): 97.9 (01 Jun 2023 13:12), Max: 98.6 (01 Jun 2023 05:45)  HR: 74 (01 Jun 2023 11:28) (43 - 86)  BP: 123/54 (01 Jun 2023 13:12) (123/54 - 167/82)  BP(mean): 81 (01 Jun 2023 11:28) (81 - 102)  ABP: --  ABP(mean): --  RR: 19 (01 Jun 2023 13:12) (18 - 24)  SpO2: 95% (01 Jun 2023 13:12) (95% - 99%)    O2 Parameters below as of 01 Jun 2023 13:12  Patient On (Oxygen Delivery Method): nasal cannula  O2 Flow (L/min): 2          Constitutional: NAD, appears weak and ill   HEENT: Atraumatic, BG, Normal, No congestion  Respiratory: diffused inspiratory crackles, air entry present B/L, no wheezing   Cardiovascular: N S1S2; Gastrointestinal: Abdomen soft, non tender, Bowel Ssounds present  Extremities: 1+ pedal edema, peripheral pulses present  Neurological: awake, alert follows commands  no gross focal motor deficits  Skin: Non cellulitic, no rash, ulcers    discharge time 45 mins   i dw with patient and daughter [phone]

## 2023-06-01 NOTE — CONSULT NOTE ADULT - PROBLEM SELECTOR RECOMMENDATION 9
Markedly elevated proBNP and increased pleural effusion suggests a mild exacerbation of HF, although she does not seem overtly volume overloaded and describes several days of poor PO intake.  Trial of diuresis while monitoring renal function / electrolytes.  Echocardiogram.

## 2023-06-01 NOTE — CHART NOTE - NSCHARTNOTEFT_GEN_A_CORE
CM was contacted by pts daughter tel , she states [facility] has initiated request for transfer to their facility.

## 2023-06-01 NOTE — DISCHARGE NOTE PROVIDER - CARE PROVIDER_API CALL
Cardiology Dr Valetnin [phone],   Phone: (   )    -  Fax: (   )    -  Follow Up Time:     Donn Bruce)  Internal Medicine; Nephrology  [address] [place] [address]  Phone: [phone]  Fax: [phone]  Follow Up Time:

## 2023-06-01 NOTE — GOALS OF CARE CONVERSATION - ADVANCED CARE PLANNING - CONVERSATION DETAILS
Advanced care directives -pt says she is DNR/DNI and does not want to be resuscitated with chest compressions if heart stops  and does not want  intubation/mechanical ventilation if need arises.Currently pt has capacity to make her own decisions ,   Advanced care directives time spent 20mins     Family wants pt transferred to [facility]

## 2023-06-01 NOTE — H&P ADULT - NSHPLABSRESULTS_GEN_ALL_CORE
9.3    12.14 )-----------( 191      ( 01 Jun 2023 09:04 )             28.5       CBC Full  -  ( 01 Jun 2023 09:04 )  WBC Count : 12.14 K/uL  RBC Count : 3.84 M/uL  Hemoglobin : 9.3 g/dL  Hematocrit : 28.5 %  Platelet Count - Automated : 191 K/uL  Mean Cell Volume : 74.2 fl  Mean Cell Hemoglobin : 24.2 pg  Mean Cell Hemoglobin Concentration : 32.6 gm/dL  Auto Neutrophil # : 9.88 K/uL  Auto Lymphocyte # : 0.51 K/uL  Auto Monocyte # : 1.61 K/uL  Auto Eosinophil # : 0.03 K/uL  Auto Basophil # : 0.06 K/uL  Auto Neutrophil % : 81.4 %  Auto Lymphocyte % : 4.2 %  Auto Monocyte % : 13.3 %  Auto Eosinophil % : 0.2 %  Auto Basophil % : 0.5 %      06-01    124<L>  |  92<L>  |  19  ----------------------------<  105<H>  3.3<L>   |  22  |  0.97    Ca    7.4<L>      01 Jun 2023 09:04    TPro  7.0  /  Alb  2.6<L>  /  TBili  0.7  /  DBili  x   /  AST  53<H>  /  ALT  27  /  AlkPhos  97  06-01      LIVER FUNCTIONS - ( 01 Jun 2023 09:04 )  Alb: 2.6 g/dL / Pro: 7.0 gm/dL / ALK PHOS: 97 U/L / ALT: 27 U/L / AST: 53 U/L / GGT: x                               MEDICATIONS  (STANDING):  anastrozole 1 milliGRAM(s) Oral daily  apixaban 2.5 milliGRAM(s) Oral two times a day  atorvastatin 40 milliGRAM(s) Oral at bedtime  budesonide 160 MICROgram(s)/formoterol 4.5 MICROgram(s) Inhaler 2 Puff(s) Inhalation two times a day  clotrimazole Lozenge 1 Lozenge Oral five times a day  gabapentin 200 milliGRAM(s) Oral at bedtime  isosorbide   mononitrate ER Tablet (IMDUR) 30 milliGRAM(s) Oral daily  multivitamin 1 Tablet(s) Oral daily  pantoprazole    Tablet 40 milliGRAM(s) Oral before breakfast  potassium chloride    Tablet ER 40 milliEquivalent(s) Oral once  sodium chloride 1.5%. 500 milliLiter(s) (50 mL/Hr) IV Continuous <Continuous>  sotalol. 80 milliGRAM(s) Oral every 12 hours  tiotropium 2.5 MICROgram(s) Inhaler 2 Puff(s) Inhalation daily

## 2023-06-02 LAB
GRAM STN FLD: SIGNIFICANT CHANGE UP
OSMOLALITY UR: 270 MOSM/KG — SIGNIFICANT CHANGE UP (ref 50–1200)
SODIUM UR-SCNC: 20 MMOL/L — SIGNIFICANT CHANGE UP
SPECIMEN SOURCE: SIGNIFICANT CHANGE UP

## 2023-06-04 LAB
-  AMIKACIN: SIGNIFICANT CHANGE UP
-  AMOXICILLIN/CLAVULANIC ACID: SIGNIFICANT CHANGE UP
-  AMPICILLIN/SULBACTAM: SIGNIFICANT CHANGE UP
-  AMPICILLIN/SULBACTAM: SIGNIFICANT CHANGE UP
-  AMPICILLIN: SIGNIFICANT CHANGE UP
-  AZTREONAM: SIGNIFICANT CHANGE UP
-  CEFAZOLIN: SIGNIFICANT CHANGE UP
-  CEFAZOLIN: SIGNIFICANT CHANGE UP
-  CEFEPIME: SIGNIFICANT CHANGE UP
-  CEFTRIAXONE: SIGNIFICANT CHANGE UP
-  CIPROFLOXACIN: SIGNIFICANT CHANGE UP
-  CLINDAMYCIN: SIGNIFICANT CHANGE UP
-  ERTAPENEM: SIGNIFICANT CHANGE UP
-  ERYTHROMYCIN: SIGNIFICANT CHANGE UP
-  GENTAMICIN: SIGNIFICANT CHANGE UP
-  GENTAMICIN: SIGNIFICANT CHANGE UP
-  IMIPENEM: SIGNIFICANT CHANGE UP
-  LEVOFLOXACIN: SIGNIFICANT CHANGE UP
-  MEROPENEM: SIGNIFICANT CHANGE UP
-  OXACILLIN: SIGNIFICANT CHANGE UP
-  PIPERACILLIN/TAZOBACTAM: SIGNIFICANT CHANGE UP
-  RIFAMPIN: SIGNIFICANT CHANGE UP
-  TETRACYCLINE: SIGNIFICANT CHANGE UP
-  TOBRAMYCIN: SIGNIFICANT CHANGE UP
-  TRIMETHOPRIM/SULFAMETHOXAZOLE: SIGNIFICANT CHANGE UP
-  TRIMETHOPRIM/SULFAMETHOXAZOLE: SIGNIFICANT CHANGE UP
-  VANCOMYCIN: SIGNIFICANT CHANGE UP
CULTURE RESULTS: SIGNIFICANT CHANGE UP
METHOD TYPE: SIGNIFICANT CHANGE UP
METHOD TYPE: SIGNIFICANT CHANGE UP
ORGANISM # SPEC MICROSCOPIC CNT: SIGNIFICANT CHANGE UP
SPECIMEN SOURCE: SIGNIFICANT CHANGE UP

## 2023-06-06 LAB
CULTURE RESULTS: SIGNIFICANT CHANGE UP
CULTURE RESULTS: SIGNIFICANT CHANGE UP
SPECIMEN SOURCE: SIGNIFICANT CHANGE UP
SPECIMEN SOURCE: SIGNIFICANT CHANGE UP

## 2023-06-07 DIAGNOSIS — I48.20 CHRONIC ATRIAL FIBRILLATION, UNSPECIFIED: ICD-10-CM

## 2023-06-07 DIAGNOSIS — K21.9 GASTRO-ESOPHAGEAL REFLUX DISEASE WITHOUT ESOPHAGITIS: ICD-10-CM

## 2023-06-07 DIAGNOSIS — E87.1 HYPO-OSMOLALITY AND HYPONATREMIA: ICD-10-CM

## 2023-06-07 DIAGNOSIS — Z88.8 ALLERGY STATUS TO OTHER DRUGS, MEDICAMENTS AND BIOLOGICAL SUBSTANCES: ICD-10-CM

## 2023-06-07 DIAGNOSIS — Z66 DO NOT RESUSCITATE: ICD-10-CM

## 2023-06-07 DIAGNOSIS — I49.5 SICK SINUS SYNDROME: ICD-10-CM

## 2023-06-07 DIAGNOSIS — E78.5 HYPERLIPIDEMIA, UNSPECIFIED: ICD-10-CM

## 2023-06-07 DIAGNOSIS — Z99.81 DEPENDENCE ON SUPPLEMENTAL OXYGEN: ICD-10-CM

## 2023-06-07 DIAGNOSIS — R00.1 BRADYCARDIA, UNSPECIFIED: ICD-10-CM

## 2023-06-07 DIAGNOSIS — Z95.0 PRESENCE OF CARDIAC PACEMAKER: ICD-10-CM

## 2023-06-07 DIAGNOSIS — E86.0 DEHYDRATION: ICD-10-CM

## 2023-06-07 DIAGNOSIS — K80.20 CALCULUS OF GALLBLADDER WITHOUT CHOLECYSTITIS WITHOUT OBSTRUCTION: ICD-10-CM

## 2023-06-07 DIAGNOSIS — Z91.041 RADIOGRAPHIC DYE ALLERGY STATUS: ICD-10-CM

## 2023-06-07 DIAGNOSIS — D64.89 OTHER SPECIFIED ANEMIAS: ICD-10-CM

## 2023-06-07 DIAGNOSIS — I50.32 CHRONIC DIASTOLIC (CONGESTIVE) HEART FAILURE: ICD-10-CM

## 2023-06-07 DIAGNOSIS — I11.0 HYPERTENSIVE HEART DISEASE WITH HEART FAILURE: ICD-10-CM

## 2023-06-07 DIAGNOSIS — E83.51 HYPOCALCEMIA: ICD-10-CM

## 2023-06-07 DIAGNOSIS — E87.6 HYPOKALEMIA: ICD-10-CM

## 2023-06-30 ENCOUNTER — APPOINTMENT (OUTPATIENT)
Dept: GASTROENTEROLOGY | Facility: CLINIC | Age: 88
End: 2023-06-30
Payer: MEDICARE

## 2023-06-30 VITALS
HEIGHT: 60 IN | HEART RATE: 92 BPM | SYSTOLIC BLOOD PRESSURE: 124 MMHG | DIASTOLIC BLOOD PRESSURE: 74 MMHG | BODY MASS INDEX: 18.85 KG/M2 | WEIGHT: 96 LBS

## 2023-06-30 PROCEDURE — 99211 OFF/OP EST MAY X REQ PHY/QHP: CPT

## 2023-06-30 NOTE — ASSESSMENT
[FreeTextEntry1] : 89 yo female with history of anemia on anticoagulation.  Awaiting stool guaiacs.  We will consider endoscopy based on findings.

## 2023-06-30 NOTE — HISTORY OF PRESENT ILLNESS
[FreeTextEntry1] : Ms. CLAY HUTTON is a 89 year old female with history of iron deficiency anemia.  Patient did not have stool test for guaiac.  Patient has no complaints of abdominal pain.  Patient is ambulating well and has no complaints of shortness of breath.\par

## 2023-06-30 NOTE — PHYSICAL EXAM

## 2023-07-05 LAB — HEMOCCULT STL QL IA: NEGATIVE

## 2023-07-13 ENCOUNTER — RESULT REVIEW (OUTPATIENT)
Age: 88
End: 2023-07-13

## 2023-07-13 ENCOUNTER — OUTPATIENT (OUTPATIENT)
Dept: OUTPATIENT SERVICES | Facility: HOSPITAL | Age: 88
LOS: 1 days | End: 2023-07-13
Payer: MEDICARE

## 2023-07-13 DIAGNOSIS — K59.09 OTHER CONSTIPATION: ICD-10-CM

## 2023-07-13 DIAGNOSIS — Z95.0 PRESENCE OF CARDIAC PACEMAKER: Chronic | ICD-10-CM

## 2023-07-13 PROCEDURE — 74240 X-RAY XM UPR GI TRC 1CNTRST: CPT

## 2023-07-13 PROCEDURE — 74240 X-RAY XM UPR GI TRC 1CNTRST: CPT | Mod: 26

## 2023-07-13 PROCEDURE — 74248 X-RAY SM INT F-THRU STD: CPT

## 2023-07-13 PROCEDURE — 74248 X-RAY SM INT F-THRU STD: CPT | Mod: 26

## 2023-07-14 DIAGNOSIS — K59.09 OTHER CONSTIPATION: ICD-10-CM

## 2023-08-10 ENCOUNTER — RX RENEWAL (OUTPATIENT)
Age: 88
End: 2023-08-10

## 2023-08-10 RX ORDER — PANTOPRAZOLE 40 MG/1
40 TABLET, DELAYED RELEASE ORAL
Qty: 180 | Refills: 3 | Status: ACTIVE | COMMUNITY
Start: 2019-05-20 | End: 1900-01-01

## 2023-08-11 ENCOUNTER — APPOINTMENT (OUTPATIENT)
Dept: GASTROENTEROLOGY | Facility: CLINIC | Age: 88
End: 2023-08-11
Payer: MEDICARE

## 2023-08-11 VITALS
SYSTOLIC BLOOD PRESSURE: 104 MMHG | BODY MASS INDEX: 18.85 KG/M2 | HEIGHT: 60 IN | HEART RATE: 90 BPM | DIASTOLIC BLOOD PRESSURE: 58 MMHG | WEIGHT: 96 LBS

## 2023-08-11 DIAGNOSIS — D64.9 ANEMIA, UNSPECIFIED: ICD-10-CM

## 2023-08-11 PROCEDURE — 99213 OFFICE O/P EST LOW 20 MIN: CPT

## 2023-08-11 NOTE — PHYSICAL EXAM

## 2023-08-11 NOTE — HISTORY OF PRESENT ILLNESS
[FreeTextEntry1] : Ms. CLAY HUTTON is a 89 year old female with history of GERD symptoms and history of anemia.  Patient has been taking iron supplementation.  Upper GI series did not show evidence of significant reflux or recurrent hiatal hernia.  Patient's weight has remained stable.  Patient has been moving bowels well.  Patient does report COPD symptoms have been increasing and she is scheduled to see Pulmonology.

## 2023-08-11 NOTE — ASSESSMENT
[FreeTextEntry1] : 88 yo female with history of GERD symptoms and anemia.  Plan to obtain CBC and iron studies.  Patient to follow-up with Pulmonology.  If patient continues to have significant symptomatic anemia, will consider virtual colonoscopy and/or capsule endoscopy.  With patient.

## 2023-08-22 LAB
FERRITIN SERPL-MCNC: 135 NG/ML
HCT VFR BLD CALC: 33.8 %
HGB BLD-MCNC: 10.6 G/DL
IRON SATN MFR SERPL: 7 %
IRON SERPL-MCNC: 17 UG/DL
MCHC RBC-ENTMCNC: 26.3 PG
MCHC RBC-ENTMCNC: 31.4 GM/DL
MCV RBC AUTO: 83.9 FL
PLATELET # BLD AUTO: 346 K/UL
RBC # BLD: 4.03 M/UL
RBC # FLD: 21.4 %
TIBC SERPL-MCNC: 231 UG/DL
UIBC SERPL-MCNC: 214 UG/DL
WBC # FLD AUTO: 11.79 K/UL

## 2023-11-05 NOTE — ED PROVIDER NOTE - CPE EDP ENMT NORM
in medical centers that focus on finding and treating lung cancer. Who is screening recommended for? Lung cancer screening is recommended for people age 48 and older who are or were heavy smokers. That means people with a smoking history of at least 20 pack years. A pack year is a way to measure how heavy a smoker you are or were. To figure out your pack years, multiply how many packs a day on average (assuming 20 cigarettes per pack) you have smoked by how many years you have smoked. For example: If you smoked 1 pack a day for 20 years, that's 1 times 20. So you have a smoking history of 20 pack years. If you smoked 2 packs a day for 10 years, that's 2 times 10. So you have a smoking history of 20 pack years. Experts agree that screening is for people who have a high risk of lung cancer. But experts don't agree on what high risk means. Some say people age 48 or older with at least a 20-pack-year smoking history are high risk. Others say it's people age 54 or older with a 30-pack-year history. To see if you could benefit from screening, first find out if you are at high risk for lung cancer. Your doctor can help you decide your lung cancer risk. What are the risks of screening? CT screening for lung cancer isn't perfect. It can show an abnormal result when it turns out there wasn't any cancer. This is called a false-positive result. This means you may need more tests to make sure you don't have cancer. These tests can be harmful and cause a lot of worry. These tests may include more CT scans and invasive testing like a lung biopsy. In a biopsy, the doctor takes a sample of tissue from inside your lung so it can be looked at under a microscope. A biopsy is the only way to tell if you have lung cancer. If the biopsy finds cancer, you and your doctor will have to decide how or whether to treat it.   Some lung cancers found on CT scans are harmless and would not have caused a problem if they had not been found
normal...

## 2023-11-27 ENCOUNTER — RX RENEWAL (OUTPATIENT)
Age: 88
End: 2023-11-27

## 2023-11-27 RX ORDER — FAMOTIDINE 40 MG/1
40 TABLET, FILM COATED ORAL DAILY
Qty: 90 | Refills: 3 | Status: ACTIVE | COMMUNITY
Start: 2020-09-08 | End: 1900-01-01

## 2024-07-24 ENCOUNTER — RX RENEWAL (OUTPATIENT)
Age: 89
End: 2024-07-24

## 2024-10-22 ENCOUNTER — RX RENEWAL (OUTPATIENT)
Age: 89
End: 2024-10-22

## 2024-11-21 ENCOUNTER — RX RENEWAL (OUTPATIENT)
Age: 89
End: 2024-11-21

## 2025-01-07 NOTE — ED PROVIDER NOTE - NS ED MD DISPO DISCHARGE CCDA
RN messaged Dr. Alves via [employer], \"PLT rpt came back at 33. If getting a paracentesis tomorrow they will need PLT at least above 50.\"       Arianna Amaro RN      Patient/Caregiver provided printed discharge information.

## 2025-01-23 ENCOUNTER — APPOINTMENT (OUTPATIENT)
Dept: DERMATOLOGY | Facility: CLINIC | Age: 89
End: 2025-01-23

## 2025-02-19 ENCOUNTER — RX RENEWAL (OUTPATIENT)
Age: 89
End: 2025-02-19